# Patient Record
Sex: FEMALE | Race: BLACK OR AFRICAN AMERICAN | NOT HISPANIC OR LATINO | Employment: OTHER | URBAN - METROPOLITAN AREA
[De-identification: names, ages, dates, MRNs, and addresses within clinical notes are randomized per-mention and may not be internally consistent; named-entity substitution may affect disease eponyms.]

---

## 2020-04-29 RX ORDER — LOSARTAN POTASSIUM AND HYDROCHLOROTHIAZIDE 12.5; 1 MG/1; MG/1
0.5 TABLET ORAL DAILY
COMMUNITY
End: 2020-11-24 | Stop reason: SDUPTHER

## 2020-04-29 RX ORDER — TRAMADOL HYDROCHLORIDE 50 MG/1
50 TABLET ORAL DAILY
COMMUNITY
End: 2022-08-03 | Stop reason: ALTCHOICE

## 2020-04-29 RX ORDER — POTASSIUM CHLORIDE 750 MG/1
10 TABLET, EXTENDED RELEASE ORAL DAILY
COMMUNITY
End: 2020-10-01 | Stop reason: SDUPTHER

## 2020-04-29 RX ORDER — ASPIRIN 81 MG/1
1 TABLET ORAL DAILY
COMMUNITY

## 2020-04-29 RX ORDER — METFORMIN HYDROCHLORIDE 500 MG/1
500 TABLET, EXTENDED RELEASE ORAL 2 TIMES DAILY
COMMUNITY
End: 2020-08-27 | Stop reason: SDUPTHER

## 2020-04-29 RX ORDER — AMLODIPINE BESYLATE 2.5 MG/1
2.5 TABLET ORAL DAILY
COMMUNITY
End: 2020-10-01 | Stop reason: SDUPTHER

## 2020-04-30 ENCOUNTER — OFFICE VISIT (OUTPATIENT)
Dept: INTERNAL MEDICINE CLINIC | Facility: CLINIC | Age: 75
End: 2020-04-30
Payer: MEDICARE

## 2020-04-30 DIAGNOSIS — I10 ESSENTIAL HYPERTENSION: ICD-10-CM

## 2020-04-30 DIAGNOSIS — E78.5 DYSLIPIDEMIA: Primary | ICD-10-CM

## 2020-04-30 DIAGNOSIS — E11.9 TYPE 2 DIABETES MELLITUS WITHOUT COMPLICATION, WITHOUT LONG-TERM CURRENT USE OF INSULIN (HCC): ICD-10-CM

## 2020-04-30 DIAGNOSIS — E13.43 DIABETIC AUTONOMIC NEUROPATHY ASSOCIATED WITH OTHER SPECIFIED DIABETES MELLITUS (HCC): ICD-10-CM

## 2020-04-30 PROBLEM — E11.40 DIABETIC NEUROPATHY (HCC): Status: ACTIVE | Noted: 2020-04-30

## 2020-04-30 PROCEDURE — 99214 OFFICE O/P EST MOD 30 MIN: CPT | Performed by: INTERNAL MEDICINE

## 2020-06-30 ENCOUNTER — TELEPHONE (OUTPATIENT)
Dept: INTERNAL MEDICINE CLINIC | Facility: CLINIC | Age: 75
End: 2020-06-30

## 2020-06-30 DIAGNOSIS — E11.9 TYPE 2 DIABETES MELLITUS WITHOUT COMPLICATION, WITHOUT LONG-TERM CURRENT USE OF INSULIN (HCC): Primary | ICD-10-CM

## 2020-06-30 DIAGNOSIS — I10 ESSENTIAL HYPERTENSION: ICD-10-CM

## 2020-08-27 DIAGNOSIS — E11.9 TYPE 2 DIABETES MELLITUS WITHOUT COMPLICATION, WITHOUT LONG-TERM CURRENT USE OF INSULIN (HCC): ICD-10-CM

## 2020-08-27 DIAGNOSIS — I10 ESSENTIAL HYPERTENSION: Primary | ICD-10-CM

## 2020-08-27 RX ORDER — METOPROLOL SUCCINATE 25 MG/1
TABLET, EXTENDED RELEASE ORAL
COMMUNITY
End: 2020-08-27 | Stop reason: SDUPTHER

## 2020-08-28 RX ORDER — METOPROLOL SUCCINATE 25 MG/1
25 TABLET, EXTENDED RELEASE ORAL DAILY
Qty: 90 TABLET | Refills: 1 | Status: SHIPPED | OUTPATIENT
Start: 2020-08-28 | End: 2020-10-01 | Stop reason: SDUPTHER

## 2020-08-28 RX ORDER — METFORMIN HYDROCHLORIDE 500 MG/1
500 TABLET, EXTENDED RELEASE ORAL 2 TIMES DAILY
Qty: 180 TABLET | Refills: 1 | Status: SHIPPED | OUTPATIENT
Start: 2020-08-28 | End: 2020-10-29 | Stop reason: SDUPTHER

## 2020-09-01 DIAGNOSIS — E78.5 DYSLIPIDEMIA: Primary | ICD-10-CM

## 2020-09-02 LAB
ALBUMIN SERPL-MCNC: 4.1 G/DL (ref 3.6–5.1)
ALBUMIN/GLOB SERPL: 1.4 (CALC) (ref 1–2.5)
ALP SERPL-CCNC: 78 U/L (ref 37–153)
ALT SERPL-CCNC: 11 U/L (ref 6–29)
AST SERPL-CCNC: 16 U/L (ref 10–35)
BASOPHILS # BLD AUTO: 47 CELLS/UL (ref 0–200)
BASOPHILS NFR BLD AUTO: 0.5 %
BILIRUB SERPL-MCNC: 0.5 MG/DL (ref 0.2–1.2)
BUN SERPL-MCNC: 16 MG/DL (ref 7–25)
BUN/CREAT SERPL: ABNORMAL (CALC) (ref 6–22)
CALCIUM SERPL-MCNC: 9.9 MG/DL (ref 8.6–10.4)
CHLORIDE SERPL-SCNC: 105 MMOL/L (ref 98–110)
CHOLEST SERPL-MCNC: 111 MG/DL
CHOLEST/HDLC SERPL: 1.9 (CALC)
CO2 SERPL-SCNC: 29 MMOL/L (ref 20–32)
CREAT SERPL-MCNC: 0.71 MG/DL (ref 0.6–0.93)
EOSINOPHIL # BLD AUTO: 381 CELLS/UL (ref 15–500)
EOSINOPHIL NFR BLD AUTO: 4.1 %
ERYTHROCYTE [DISTWIDTH] IN BLOOD BY AUTOMATED COUNT: 16.5 % (ref 11–15)
GLOBULIN SER CALC-MCNC: 2.9 G/DL (CALC) (ref 1.9–3.7)
GLUCOSE SERPL-MCNC: 102 MG/DL (ref 65–99)
HBA1C MFR BLD: 6.7 % OF TOTAL HGB
HCT VFR BLD AUTO: 33.8 % (ref 35–45)
HDLC SERPL-MCNC: 57 MG/DL
HGB BLD-MCNC: 10.2 G/DL (ref 11.7–15.5)
LDLC SERPL CALC-MCNC: 39 MG/DL (CALC)
LYMPHOCYTES # BLD AUTO: 2781 CELLS/UL (ref 850–3900)
LYMPHOCYTES NFR BLD AUTO: 29.9 %
MCH RBC QN AUTO: 20.2 PG (ref 27–33)
MCHC RBC AUTO-ENTMCNC: 30.2 G/DL (ref 32–36)
MCV RBC AUTO: 66.8 FL (ref 80–100)
MONOCYTES # BLD AUTO: 707 CELLS/UL (ref 200–950)
MONOCYTES NFR BLD AUTO: 7.6 %
NEUTROPHILS # BLD AUTO: 5385 CELLS/UL (ref 1500–7800)
NEUTROPHILS NFR BLD AUTO: 57.9 %
NONHDLC SERPL-MCNC: 54 MG/DL (CALC)
PLATELET # BLD AUTO: 363 THOUSAND/UL (ref 140–400)
PMV BLD REES-ECKER: 10.6 FL (ref 7.5–12.5)
POTASSIUM SERPL-SCNC: 4.2 MMOL/L (ref 3.5–5.3)
PROT SERPL-MCNC: 7 G/DL (ref 6.1–8.1)
RBC # BLD AUTO: 5.06 MILLION/UL (ref 3.8–5.1)
SL AMB EGFR AFRICAN AMERICAN: 96 ML/MIN/1.73M2
SL AMB EGFR NON AFRICAN AMERICAN: 83 ML/MIN/1.73M2
SODIUM SERPL-SCNC: 140 MMOL/L (ref 135–146)
TRIGL SERPL-MCNC: 66 MG/DL
WBC # BLD AUTO: 9.3 THOUSAND/UL (ref 3.8–10.8)

## 2020-09-02 RX ORDER — ATORVASTATIN CALCIUM 10 MG/1
TABLET, FILM COATED ORAL
Qty: 90 TABLET | Refills: 1 | Status: SHIPPED | OUTPATIENT
Start: 2020-09-02 | End: 2020-12-10 | Stop reason: SDUPTHER

## 2020-09-03 ENCOUNTER — TELEPHONE (OUTPATIENT)
Dept: FAMILY MEDICINE CLINIC | Facility: CLINIC | Age: 75
End: 2020-09-03

## 2020-09-04 NOTE — TELEPHONE ENCOUNTER
I called [facility] and left message on machine per Dr Booth Mantle she will need an appt to discuss lab results in office  I advised her to give our office a call back to schedule appt

## 2020-09-09 ENCOUNTER — OFFICE VISIT (OUTPATIENT)
Dept: FAMILY MEDICINE CLINIC | Facility: CLINIC | Age: 75
End: 2020-09-09
Payer: MEDICARE

## 2020-09-09 VITALS
OXYGEN SATURATION: 98 % | BODY MASS INDEX: 24.98 KG/M2 | TEMPERATURE: 97.8 F | HEART RATE: 78 BPM | WEIGHT: 136.6 LBS | DIASTOLIC BLOOD PRESSURE: 60 MMHG | SYSTOLIC BLOOD PRESSURE: 120 MMHG | RESPIRATION RATE: 16 BRPM

## 2020-09-09 DIAGNOSIS — L65.9 FALLING HAIR: ICD-10-CM

## 2020-09-09 DIAGNOSIS — E78.5 DYSLIPIDEMIA: ICD-10-CM

## 2020-09-09 DIAGNOSIS — I10 ESSENTIAL HYPERTENSION: ICD-10-CM

## 2020-09-09 DIAGNOSIS — E11.9 TYPE 2 DIABETES MELLITUS WITHOUT COMPLICATION, WITHOUT LONG-TERM CURRENT USE OF INSULIN (HCC): Primary | ICD-10-CM

## 2020-09-09 PROCEDURE — 99214 OFFICE O/P EST MOD 30 MIN: CPT | Performed by: INTERNAL MEDICINE

## 2020-10-01 DIAGNOSIS — I10 ESSENTIAL HYPERTENSION: ICD-10-CM

## 2020-10-01 DIAGNOSIS — E87.6 LOW BLOOD POTASSIUM: Primary | ICD-10-CM

## 2020-10-01 RX ORDER — METOPROLOL SUCCINATE 25 MG/1
25 TABLET, EXTENDED RELEASE ORAL DAILY
Qty: 90 TABLET | Refills: 1 | Status: SHIPPED | OUTPATIENT
Start: 2020-10-01 | End: 2020-12-10 | Stop reason: SDUPTHER

## 2020-10-01 RX ORDER — POTASSIUM CHLORIDE 750 MG/1
10 TABLET, EXTENDED RELEASE ORAL DAILY
Qty: 90 TABLET | Refills: 1 | Status: SHIPPED | OUTPATIENT
Start: 2020-10-01 | End: 2020-12-10 | Stop reason: SDUPTHER

## 2020-10-01 RX ORDER — AMLODIPINE BESYLATE 2.5 MG/1
2.5 TABLET ORAL DAILY
Qty: 90 TABLET | Refills: 1 | Status: SHIPPED | OUTPATIENT
Start: 2020-10-01 | End: 2020-12-10 | Stop reason: SDUPTHER

## 2020-10-29 DIAGNOSIS — E11.9 TYPE 2 DIABETES MELLITUS WITHOUT COMPLICATION, WITHOUT LONG-TERM CURRENT USE OF INSULIN (HCC): ICD-10-CM

## 2020-10-29 RX ORDER — METFORMIN HYDROCHLORIDE 500 MG/1
TABLET, EXTENDED RELEASE ORAL
Qty: 180 TABLET | Refills: 1 | Status: SHIPPED | OUTPATIENT
Start: 2020-10-29 | End: 2021-05-28 | Stop reason: SDUPTHER

## 2020-10-29 RX ORDER — METFORMIN HYDROCHLORIDE 500 MG/1
500 TABLET, EXTENDED RELEASE ORAL 2 TIMES DAILY
Qty: 60 TABLET | Refills: 0 | Status: SHIPPED | OUTPATIENT
Start: 2020-10-29 | End: 2020-10-29

## 2020-11-02 ENCOUNTER — IMMUNIZATIONS (OUTPATIENT)
Dept: FAMILY MEDICINE CLINIC | Facility: CLINIC | Age: 75
End: 2020-11-02
Payer: MEDICARE

## 2020-11-02 DIAGNOSIS — Z23 FLU VACCINE NEED: Primary | ICD-10-CM

## 2020-11-02 PROCEDURE — G0008 ADMIN INFLUENZA VIRUS VAC: HCPCS | Performed by: INTERNAL MEDICINE

## 2020-11-02 PROCEDURE — 90662 IIV NO PRSV INCREASED AG IM: CPT | Performed by: INTERNAL MEDICINE

## 2020-11-24 DIAGNOSIS — I10 ESSENTIAL HYPERTENSION: Primary | ICD-10-CM

## 2020-11-25 RX ORDER — LOSARTAN POTASSIUM AND HYDROCHLOROTHIAZIDE 12.5; 1 MG/1; MG/1
0.5 TABLET ORAL DAILY
Qty: 90 TABLET | Refills: 1 | Status: SHIPPED | OUTPATIENT
Start: 2020-11-25 | End: 2021-10-07 | Stop reason: SDUPTHER

## 2020-12-04 ENCOUNTER — TELEPHONE (OUTPATIENT)
Dept: FAMILY MEDICINE CLINIC | Facility: CLINIC | Age: 75
End: 2020-12-04

## 2020-12-10 ENCOUNTER — OFFICE VISIT (OUTPATIENT)
Dept: FAMILY MEDICINE CLINIC | Facility: CLINIC | Age: 75
End: 2020-12-10
Payer: MEDICARE

## 2020-12-10 VITALS
TEMPERATURE: 97.7 F | OXYGEN SATURATION: 97 % | DIASTOLIC BLOOD PRESSURE: 70 MMHG | SYSTOLIC BLOOD PRESSURE: 144 MMHG | RESPIRATION RATE: 16 BRPM | HEIGHT: 62 IN | BODY MASS INDEX: 26.13 KG/M2 | WEIGHT: 142 LBS | HEART RATE: 82 BPM

## 2020-12-10 DIAGNOSIS — E78.5 DYSLIPIDEMIA: ICD-10-CM

## 2020-12-10 DIAGNOSIS — E11.9 TYPE 2 DIABETES MELLITUS WITHOUT COMPLICATION, WITHOUT LONG-TERM CURRENT USE OF INSULIN (HCC): Primary | ICD-10-CM

## 2020-12-10 DIAGNOSIS — I10 ESSENTIAL HYPERTENSION: ICD-10-CM

## 2020-12-10 DIAGNOSIS — E87.6 LOW BLOOD POTASSIUM: ICD-10-CM

## 2020-12-10 PROBLEM — D64.9 CHRONIC ANEMIA: Status: ACTIVE | Noted: 2020-12-10

## 2020-12-10 PROCEDURE — 99214 OFFICE O/P EST MOD 30 MIN: CPT | Performed by: INTERNAL MEDICINE

## 2020-12-10 RX ORDER — METOPROLOL SUCCINATE 25 MG/1
25 TABLET, EXTENDED RELEASE ORAL DAILY
Qty: 90 TABLET | Refills: 1 | Status: SHIPPED | OUTPATIENT
Start: 2020-12-10 | End: 2021-07-01 | Stop reason: SDUPTHER

## 2020-12-10 RX ORDER — AMLODIPINE BESYLATE 2.5 MG/1
2.5 TABLET ORAL DAILY
Qty: 90 TABLET | Refills: 1 | Status: SHIPPED | OUTPATIENT
Start: 2020-12-10 | End: 2021-07-01 | Stop reason: SDUPTHER

## 2020-12-10 RX ORDER — ATORVASTATIN CALCIUM 10 MG/1
10 TABLET, FILM COATED ORAL DAILY
Qty: 90 TABLET | Refills: 1 | Status: SHIPPED | OUTPATIENT
Start: 2020-12-10 | End: 2021-07-01 | Stop reason: SDUPTHER

## 2020-12-10 RX ORDER — POTASSIUM CHLORIDE 750 MG/1
10 TABLET, EXTENDED RELEASE ORAL DAILY
Qty: 90 TABLET | Refills: 1 | Status: SHIPPED | OUTPATIENT
Start: 2020-12-10 | End: 2021-10-05

## 2020-12-30 ENCOUNTER — LAB (OUTPATIENT)
Dept: LAB | Facility: HOSPITAL | Age: 75
End: 2020-12-30
Payer: MEDICARE

## 2020-12-30 DIAGNOSIS — L65.9 FALLING HAIR: ICD-10-CM

## 2020-12-30 LAB — TSH SERPL DL<=0.05 MIU/L-ACNC: 2.11 UIU/ML (ref 0.34–5.6)

## 2020-12-30 PROCEDURE — 36415 COLL VENOUS BLD VENIPUNCTURE: CPT

## 2020-12-30 PROCEDURE — 84443 ASSAY THYROID STIM HORMONE: CPT

## 2021-01-07 ENCOUNTER — TELEPHONE (OUTPATIENT)
Dept: FAMILY MEDICINE CLINIC | Facility: CLINIC | Age: 76
End: 2021-01-07

## 2021-01-21 ENCOUNTER — OFFICE VISIT (OUTPATIENT)
Dept: FAMILY MEDICINE CLINIC | Facility: CLINIC | Age: 76
End: 2021-01-21
Payer: MEDICARE

## 2021-01-21 VITALS
BODY MASS INDEX: 26.68 KG/M2 | DIASTOLIC BLOOD PRESSURE: 70 MMHG | SYSTOLIC BLOOD PRESSURE: 140 MMHG | HEIGHT: 62 IN | WEIGHT: 145 LBS | TEMPERATURE: 98.3 F | RESPIRATION RATE: 16 BRPM | OXYGEN SATURATION: 98 % | HEART RATE: 60 BPM

## 2021-01-21 DIAGNOSIS — K11.20 SIALADENITIS: ICD-10-CM

## 2021-01-21 DIAGNOSIS — D64.9 CHRONIC ANEMIA: ICD-10-CM

## 2021-01-21 DIAGNOSIS — R22.1 LUMP IN NECK: ICD-10-CM

## 2021-01-21 DIAGNOSIS — E11.9 TYPE 2 DIABETES MELLITUS WITHOUT COMPLICATION, WITHOUT LONG-TERM CURRENT USE OF INSULIN (HCC): Primary | ICD-10-CM

## 2021-01-21 DIAGNOSIS — E78.5 DYSLIPIDEMIA: ICD-10-CM

## 2021-01-21 DIAGNOSIS — I10 ESSENTIAL HYPERTENSION: ICD-10-CM

## 2021-01-21 PROCEDURE — 99214 OFFICE O/P EST MOD 30 MIN: CPT | Performed by: INTERNAL MEDICINE

## 2021-01-21 RX ORDER — AMOXICILLIN 500 MG/1
500 CAPSULE ORAL EVERY 8 HOURS SCHEDULED
Qty: 21 CAPSULE | Refills: 0 | Status: SHIPPED | OUTPATIENT
Start: 2021-01-21 | End: 2021-01-28

## 2021-01-21 NOTE — ASSESSMENT & PLAN NOTE
Lab Results   Component Value Date    HGBA1C 6 7 (H) 09/01/2020   HGBA1C at goal , continue metformin 500 mg daily  Educated on lifestyle modifications

## 2021-01-21 NOTE — ASSESSMENT & PLAN NOTE
History of chronic anemia  Hemoglobin 10 2 g per dL on September 2020  likely due to thalassemia  Patient denies any active bleeding  Will check CBC

## 2021-01-21 NOTE — ASSESSMENT & PLAN NOTE
Patient blood pressure was mildly elevated in the office, patient states she has not been compliant with her dietary restriction, she has been eating salty food lately  Continue amlodipine, Losartan hydrochlorothiazide and metoprolol  No side effects reported

## 2021-01-21 NOTE — ASSESSMENT & PLAN NOTE
Patient has a lump in the submandibular area measuring approximately 2-3 cm, mildly tender  Patient states she first noted 1 week ego, is been decreasing since then  Patient denies any fever, she did have some sinus pressure, sore throat on postnasal drip  Advised supportive treatment, amoxicillin 500 mg every 8 hours for 7 days

## 2021-01-21 NOTE — ASSESSMENT & PLAN NOTE
Patient has a lump in the submandibular area measuring approximately 2-3 cm, mildly tender  Patient states she 1st noticed the 1 week ego, is been decreasing since then  Patient denies any fever, she did have some sinus pressure, sore throat on postnasal drip    US neck ordered

## 2021-01-21 NOTE — PROGRESS NOTES
Assessment/Plan:    Type 2 diabetes mellitus without complication, without long-term current use of insulin ([facility])    Lab Results   Component Value Date    HGBA1C 6 7 (H) 09/01/2020   HGBA1C at goal , continue metformin 500 mg daily  Educated on lifestyle modifications     Essential hypertension  Patient blood pressure was mildly elevated in the office, patient states she has not been compliant with her dietary restriction, she has been eating salty food lately  Continue amlodipine, Losartan hydrochlorothiazide and metoprolol  No side effects reported  Dyslipidemia  Continue atorvastatin 10 mg daily  Lipid profile from September 2020 reviewed  Chronic anemia  History of chronic anemia  Hemoglobin 10 2 g per dL on September 2020  likely due to thalassemia  Patient denies any active bleeding  Will check CBC  Lump in neck  Patient has a lump in the submandibular area measuring approximately 2-3 cm, mildly tender  Patient states she 1st noticed the 1 week ego, is been decreasing since then  Patient denies any fever, she did have some sinus pressure, sore throat on postnasal drip  US neck ordered           Sialadenitis  Patient has a lump in the submandibular area measuring approximately 2-3 cm, mildly tender  Patient states she first noted 1 week ego, is been decreasing since then  Patient denies any fever, she did have some sinus pressure, sore throat on postnasal drip  Advised supportive treatment, amoxicillin 500 mg every 8 hours for 7 days  Diagnoses and all orders for this visit:    Type 2 diabetes mellitus without complication, without long-term current use of insulin ([facility])    Essential hypertension    Dyslipidemia    Chronic anemia    Lump in neck  -     US head neck soft tissue; Future  -     amoxicillin (AMOXIL) 500 mg capsule;  Take 1 capsule (500 mg total) by mouth every 8 (eight) hours for 7 days    Sialadenitis          Subjective:      Patient ID: Shaheen Castaneda is a 67 y o  female  Patient is a 50-year-old female with a past medical history significant for hypertension, hyperlipidemia, status see me a, she comes today complaining of a lump in her neck the last week, she has been feeling also some sinus pressure and post nasal drip  No nausea, no vomiting, no fever, no chills, no diarrhea  Patient denies any sick contacts  BP noted to be mildly elevated in the office, patient states she has been taking all her medications including her antihypertensive  Blood work from September review  TSH noted to be within normal limits  The following portions of the patient's history were reviewed and updated as appropriate: allergies, current medications, past family history, past medical history, past social history, past surgical history and problem list     Review of Systems   Constitutional: Negative for appetite change, chills and fever  HENT: Positive for sinus pressure, sinus pain, sore throat and voice change  Negative for congestion, rhinorrhea, sneezing and trouble swallowing  Eyes: Negative for discharge  Respiratory: Negative for cough and shortness of breath  Cardiovascular: Positive for leg swelling  Negative for chest pain and palpitations  Gastrointestinal: Negative for blood in stool, constipation, diarrhea, nausea and vomiting  Genitourinary: Negative for difficulty urinating  Musculoskeletal: Positive for arthralgias  Skin: Negative for rash  Neurological: Negative for dizziness and headaches  Hematological: Positive for adenopathy  Psychiatric/Behavioral: Positive for sleep disturbance  Objective:      /70 (BP Location: Left arm, Patient Position: Sitting, Cuff Size: Standard)   Pulse 60   Temp 98 3 °F (36 8 °C) (Temporal)   Resp 16   Ht 5' 2" (1 575 m)   Wt 65 8 kg (145 lb)   SpO2 98%   BMI 26 52 kg/m²          Physical Exam  Vitals signs reviewed  Constitutional:       Appearance: She is obese     HENT: Head: Normocephalic  Right Ear: Tympanic membrane, ear canal and external ear normal  There is no impacted cerumen  Left Ear: Tympanic membrane, ear canal and external ear normal  There is no impacted cerumen  Nose: No congestion or rhinorrhea  Mouth/Throat:      Pharynx: No oropharyngeal exudate or posterior oropharyngeal erythema  Eyes:      General: No scleral icterus  Extraocular Movements: Extraocular movements intact  Conjunctiva/sclera: Conjunctivae normal       Pupils: Pupils are equal, round, and reactive to light  Cardiovascular:      Rate and Rhythm: Normal rate and regular rhythm  Pulses: Normal pulses  Heart sounds: No murmur  Pulmonary:      Effort: Pulmonary effort is normal  No respiratory distress  Breath sounds: Normal breath sounds  No stridor  No wheezing or rhonchi  Abdominal:      General: Bowel sounds are normal  There is no distension  Tenderness: There is no abdominal tenderness  Musculoskeletal:      Right lower leg: Edema present  Left lower leg: Edema present  Lymphadenopathy:      Cervical: Cervical adenopathy present  Skin:     General: Skin is warm  Coloration: Skin is not jaundiced  Neurological:      General: No focal deficit present  Mental Status: She is alert and oriented to person, place, and time     Psychiatric:         Mood and Affect: Mood normal          Behavior: Behavior normal

## 2021-01-27 ENCOUNTER — HOSPITAL ENCOUNTER (OUTPATIENT)
Dept: ULTRASOUND IMAGING | Facility: HOSPITAL | Age: 76
Discharge: HOME | End: 2021-01-27
Payer: MEDICARE

## 2021-01-27 DIAGNOSIS — R22.1 LUMP IN NECK: ICD-10-CM

## 2021-01-27 DIAGNOSIS — E11.9 TYPE 2 DIABETES MELLITUS WITHOUT COMPLICATION, WITHOUT LONG-TERM CURRENT USE OF INSULIN (HCC): Primary | ICD-10-CM

## 2021-01-27 PROCEDURE — 76536 US EXAM OF HEAD AND NECK: CPT

## 2021-01-27 NOTE — TELEPHONE ENCOUNTER
Patient requesting refill for One touch ultra blue test strips, 90 day supply  A message was left for patient to call back to confirm how many times per day she tests her blood sugar

## 2021-01-28 ENCOUNTER — IMMUNIZATIONS (OUTPATIENT)
Dept: FAMILY MEDICINE CLINIC | Facility: HOSPITAL | Age: 76
End: 2021-01-28

## 2021-01-28 DIAGNOSIS — Z23 ENCOUNTER FOR IMMUNIZATION: Primary | ICD-10-CM

## 2021-01-28 PROCEDURE — 0011A SARS-COV-2 / COVID-19 MRNA VACCINE (MODERNA) 100 MCG: CPT

## 2021-01-28 PROCEDURE — 91301 SARS-COV-2 / COVID-19 MRNA VACCINE (MODERNA) 100 MCG: CPT

## 2021-02-24 ENCOUNTER — IMMUNIZATIONS (OUTPATIENT)
Dept: FAMILY MEDICINE CLINIC | Facility: HOSPITAL | Age: 76
End: 2021-02-24

## 2021-02-24 DIAGNOSIS — Z23 ENCOUNTER FOR IMMUNIZATION: Primary | ICD-10-CM

## 2021-02-24 DIAGNOSIS — E11.9 TYPE 2 DIABETES MELLITUS WITHOUT COMPLICATION, WITHOUT LONG-TERM CURRENT USE OF INSULIN (HCC): ICD-10-CM

## 2021-02-24 PROCEDURE — 91301 SARS-COV-2 / COVID-19 MRNA VACCINE (MODERNA) 100 MCG: CPT

## 2021-02-24 PROCEDURE — 0012A SARS-COV-2 / COVID-19 MRNA VACCINE (MODERNA) 100 MCG: CPT

## 2021-03-03 DIAGNOSIS — E11.9 TYPE 2 DIABETES MELLITUS WITHOUT COMPLICATION, WITHOUT LONG-TERM CURRENT USE OF INSULIN (HCC): ICD-10-CM

## 2021-03-09 ENCOUNTER — OFFICE VISIT (OUTPATIENT)
Dept: FAMILY MEDICINE CLINIC | Facility: CLINIC | Age: 76
End: 2021-03-09
Payer: MEDICARE

## 2021-03-09 VITALS
SYSTOLIC BLOOD PRESSURE: 140 MMHG | HEART RATE: 60 BPM | TEMPERATURE: 97.9 F | OXYGEN SATURATION: 98 % | WEIGHT: 145 LBS | BODY MASS INDEX: 26.68 KG/M2 | DIASTOLIC BLOOD PRESSURE: 80 MMHG | RESPIRATION RATE: 18 BRPM | HEIGHT: 62 IN

## 2021-03-09 DIAGNOSIS — R10.84 GENERALIZED ABDOMINAL PAIN: ICD-10-CM

## 2021-03-09 DIAGNOSIS — Z00.00 MEDICARE ANNUAL WELLNESS VISIT, INITIAL: Primary | ICD-10-CM

## 2021-03-09 PROBLEM — R10.9 ABDOMINAL PAIN: Status: ACTIVE | Noted: 2021-03-09

## 2021-03-09 PROBLEM — M25.512 LEFT SHOULDER PAIN: Status: ACTIVE | Noted: 2021-03-09

## 2021-03-09 PROCEDURE — 1123F ACP DISCUSS/DSCN MKR DOCD: CPT | Performed by: INTERNAL MEDICINE

## 2021-03-09 PROCEDURE — G0438 PPPS, INITIAL VISIT: HCPCS | Performed by: INTERNAL MEDICINE

## 2021-03-09 PROCEDURE — 99214 OFFICE O/P EST MOD 30 MIN: CPT | Performed by: INTERNAL MEDICINE

## 2021-03-09 RX ORDER — PANTOPRAZOLE SODIUM 40 MG/1
40 TABLET, DELAYED RELEASE ORAL DAILY
Qty: 30 TABLET | Refills: 0 | Status: SHIPPED | OUTPATIENT
Start: 2021-03-09 | End: 2021-04-05 | Stop reason: SDUPTHER

## 2021-03-09 NOTE — PATIENT INSTRUCTIONS
Medicare Preventive Visit Patient Instructions  Thank you for completing your Welcome to Medicare Visit or Medicare Annual Wellness Visit today  Your next wellness visit will be due in one year (3/10/2022)  The screening/preventive services that you may require over the next 5-10 years are detailed below  Some tests may not apply to you based off risk factors and/or age  Screening tests ordered at today's visit but not completed yet may show as past due  Also, please note that scanned in results may not display below  Preventive Screenings:  Service Recommendations Previous Testing/Comments   Colorectal Cancer Screening  * Colonoscopy    * Fecal Occult Blood Test (FOBT)/Fecal Immunochemical Test (FIT)  * Fecal DNA/Cologuard Test  * Flexible Sigmoidoscopy Age: 54-65 years old   Colonoscopy: every 10 years (may be performed more frequently if at higher risk)  OR  FOBT/FIT: every 1 year  OR  Cologuard: every 3 years  OR  Sigmoidoscopy: every 5 years  Screening may be recommended earlier than age 48 if at higher risk for colorectal cancer  Also, an individualized decision between you and your healthcare provider will decide whether screening between the ages of 74-80 would be appropriate  Colonoscopy: Not on file  FOBT/FIT: Not on file  Cologuard: Not on file  Sigmoidoscopy: Not on file    Due for Colonoscopy - Low Risk     Breast Cancer Screening Age: 36 years old  Frequency: every 1-2 years  Not required if history of left and right mastectomy Mammogram: Not on file    Risks and Benefits Discussed   Cervical Cancer Screening Between the ages of 21-29, pap smear recommended once every 3 years  Between the ages of 33-67, can perform pap smear with HPV co-testing every 5 years     Recommendations may differ for women with a history of total hysterectomy, cervical cancer, or abnormal pap smears in past  Pap Smear: Not on file    Risks and Benefits Discussed   Hepatitis C Screening Once for adults born between 1945 and 1965  More frequently in patients at high risk for Hepatitis C Hep C Antibody: Not on file    Risks and Benefits Discussed   Diabetes Screening 1-2 times per year if you're at risk for diabetes or have pre-diabetes Fasting glucose: No results in last 5 years   A1C: 6 7 % of total Hgb    Screening Not Indicated  History Diabetes  Risks and Benefits Discussed   Cholesterol Screening Once every 5 years if you don't have a lipid disorder  May order more often based on risk factors  Lipid panel: 09/01/2020    Screening Current     Other Preventive Screenings Covered by Medicare:  1  Abdominal Aortic Aneurysm (AAA) Screening: covered once if your at risk  You're considered to be at risk if you have a family history of AAA  2  Lung Cancer Screening: covers low dose CT scan once per year if you meet all of the following conditions: (1) Age 50-69; (2) No signs or symptoms of lung cancer; (3) Current smoker or have quit smoking within the last 15 years; (4) You have a tobacco smoking history of at least 30 pack years (packs per day multiplied by number of years you smoked); (5) You get a written order from a healthcare provider  3  Glaucoma Screening: covered annually if you're considered high risk: (1) You have diabetes OR (2) Family history of glaucoma OR (3)  aged 48 and older OR (3)  American aged 72 and older  3  Osteoporosis Screening: covered every 2 years if you meet one of the following conditions: (1) You're estrogen deficient and at risk for osteoporosis based off medical history and other findings; (2) Have a vertebral abnormality; (3) On glucocorticoid therapy for more than 3 months; (4) Have primary hyperparathyroidism; (5) On osteoporosis medications and need to assess response to drug therapy  · Last bone density test (DXA Scan): Not on file  5  HIV Screening: covered annually if you're between the age of 12-76   Also covered annually if you are younger than 13 and older than 72 with risk factors for HIV infection  For pregnant patients, it is covered up to 3 times per pregnancy  Immunizations:  Immunization Recommendations   Influenza Vaccine Annual influenza vaccination during flu season is recommended for all persons aged >= 6 months who do not have contraindications   Pneumococcal Vaccine (Prevnar and Pneumovax)  * Prevnar = PCV13  * Pneumovax = PPSV23   Adults 25-60 years old: 1-3 doses may be recommended based on certain risk factors  Adults 72 years old: Prevnar (PCV13) vaccine recommended followed by Pneumovax (PPSV23) vaccine  If already received PPSV23 since turning 65, then PCV13 recommended at least one year after PPSV23 dose  Hepatitis B Vaccine 3 dose series if at intermediate or high risk (ex: diabetes, end stage renal disease, liver disease)   Tetanus (Td) Vaccine - COST NOT COVERED BY MEDICARE PART B Following completion of primary series, a booster dose should be given every 10 years to maintain immunity against tetanus  Td may also be given as tetanus wound prophylaxis  Tdap Vaccine - COST NOT COVERED BY MEDICARE PART B Recommended at least once for all adults  For pregnant patients, recommended with each pregnancy  Shingles Vaccine (Shingrix) - COST NOT COVERED BY MEDICARE PART B  2 shot series recommended in those aged 48 and above     Health Maintenance Due:  There are no preventive care reminders to display for this patient  Immunizations Due:      Topic Date Due    DTaP,Tdap,and Td Vaccines (1 - Tdap) 07/02/1966    Pneumococcal Vaccine: 65+ Years (1 of 1 - PPSV23) 07/02/2009     Advance Directives   What are advance directives? Advance directives are legal documents that state your wishes and plans for medical care  These plans are made ahead of time in case you lose your ability to make decisions for yourself  Advance directives can apply to any medical decision, such as the treatments you want, and if you want to donate organs     What are the types of advance directives? There are many types of advance directives, and each state has rules about how to use them  You may choose a combination of any of the following:  · Living will: This is a written record of the treatment you want  You can also choose which treatments you do not want, which to limit, and which to stop at a certain time  This includes surgery, medicine, IV fluid, and tube feedings  · Durable power of  for healthcare [facility]): This is a written record that states who you want to make healthcare choices for you when you are unable to make them for yourself  This person, called a proxy, is usually a family member or a friend  You may choose more than 1 proxy  · Do not resuscitate (DNR) order:  A DNR order is used in case your heart stops beating or you stop breathing  It is a request not to have certain forms of treatment, such as CPR  A DNR order may be included in other types of advance directives  · Medical directive: This covers the care that you want if you are in a coma, near death, or unable to make decisions for yourself  You can list the treatments you want for each condition  Treatment may include pain medicine, surgery, blood transfusions, dialysis, IV or tube feedings, and a ventilator (breathing machine)  · Values history: This document has questions about your views, beliefs, and how you feel and think about life  This information can help others choose the care that you would choose  Why are advance directives important? An advance directive helps you control your care  Although spoken wishes may be used, it is better to have your wishes written down  Spoken wishes can be misunderstood, or not followed  Treatments may be given even if you do not want them  An advance directive may make it easier for your family to make difficult choices about your care     Weight Management   Why it is important to manage your weight:  Being overweight increases your risk of health conditions such as heart disease, high blood pressure, type 2 diabetes, and certain types of cancer  It can also increase your risk for osteoarthritis, sleep apnea, and other respiratory problems  Aim for a slow, steady weight loss  Even a small amount of weight loss can lower your risk of health problems  How to lose weight safely:  A safe and healthy way to lose weight is to eat fewer calories and get regular exercise  You can lose up about 1 pound a week by decreasing the number of calories you eat by 500 calories each day  Healthy meal plan for weight management:  A healthy meal plan includes a variety of foods, contains fewer calories, and helps you stay healthy  A healthy meal plan includes the following:  · Eat whole-grain foods more often  A healthy meal plan should contain fiber  Fiber is the part of grains, fruits, and vegetables that is not broken down by your body  Whole-grain foods are healthy and provide extra fiber in your diet  Some examples of whole-grain foods are whole-wheat breads and pastas, oatmeal, brown rice, and bulgur  · Eat a variety of vegetables every day  Include dark, leafy greens such as spinach, kale, juanis greens, and mustard greens  Eat yellow and orange vegetables such as carrots, sweet potatoes, and winter squash  · Eat a variety of fruits every day  Choose fresh or canned fruit (canned in its own juice or light syrup) instead of juice  Fruit juice has very little or no fiber  · Eat low-fat dairy foods  Drink fat-free (skim) milk or 1% milk  Eat fat-free yogurt and low-fat cottage cheese  Try low-fat cheeses such as mozzarella and other reduced-fat cheeses  · Choose meat and other protein foods that are low in fat  Choose beans or other legumes such as split peas or lentils  Choose fish, skinless poultry (chicken or turkey), or lean cuts of red meat (beef or pork)  Before you cook meat or poultry, cut off any visible fat  · Use less fat and oil    Try baking foods instead of frying them  Add less fat, such as margarine, sour cream, regular salad dressing and mayonnaise to foods  Eat fewer high-fat foods  Some examples of high-fat foods include french fries, doughnuts, ice cream, and cakes  · Eat fewer sweets  Limit foods and drinks that are high in sugar  This includes candy, cookies, regular soda, and sweetened drinks  Exercise:  Exercise at least 30 minutes per day on most days of the week  Some examples of exercise include walking, biking, dancing, and swimming  You can also fit in more physical activity by taking the stairs instead of the elevator or parking farther away from stores  Ask your healthcare provider about the best exercise plan for you  © Copyright [employer] 2018 Information is for End User's use only and may not be sold, redistributed or otherwise used for commercial purposes   All illustrations and images included in CareNotes® are the copyrighted property of [employer]  or [address] [place] [address]

## 2021-03-09 NOTE — PROGRESS NOTES
Assessment/Plan:    Abdominal pain  · Patient c/o abdominal pain 1 5 weeks, feels bloated  Fluctuates in intensity  Difficult to characterize, describes it as annoying pain  Pain radiates to under the left breast  No nausea, vomiting, feels hungry but is afraid to eat as she is scared about pain aggravation  No diarrhea, constipation  Has been drinking lance tea for the pain, takes OTC Tums which relieves the pain partially  No melena, hematochezia  Has been passing gas but reports trapping as well  · Had work up for pancreatic pathology few years ago, as per patient, which was unremarkable  Was advised to avoid fatty and spicy foods --> she felt better after that  · However, recently has not been compliant with low fat diet  · O/e : epigastric and left lower quadrant discomfort     PLAN :   · US abdomen to evaluate for gall bladder pathology   · CMP, CBC   · Protonix 40 mg PO daily     Lump in neck  · Patient had a lump in the neck, on the right side   · Underwent US : which showed normal-appearing subcentimeter lymph nodes in the area of concern  No enlarged LNs, suspicious mass or localized fluid collection  Mildly heterogenous submandibular glands b/l, non specific   · Lump has decreased significantly in size  Patient also received a course of Augmentin for suspected sialadenitis during prior visit  Left shoulder pain  · Patient c/o left shoulder and neck pain  · Went to [facility] on Feb 25 --> X rays were done, and was given methocarbamol  · Mild stiffness on shoulder exam   · Patient does not feel ready to go for physical therapy yet due to COVID risk     PLAN :   · Observe   · Continue supportive treatment   · If pain persists, will recommend physical therapy     Sialadenitis  · Completed a course of Augmentin during prior visit       Type 2 diabetes mellitus without complication, without long-term current use of insulin (HCC)    Lab Results   Component Value Date    HGBA1C 6 7 (H) 09/01/2020   · HbA1c at goal : 6 7   · Patient is on metformin 500 mg PO daily  Compliant, experiences no side effects     Essential hypertension  · Patient is on amlodipine, losartan- HCTZ (has been taking the full dose now)   · BP today: 140 / 80 mmHg  · Is not compliant with low salt diet   · Has been advised dietary changes  Diagnoses and all orders for this visit:    Medicare annual wellness visit, initial    Generalized abdominal pain  -     US abdomen complete; Future  -     Comprehensive metabolic panel; Future  -     CBC and differential; Future  -     pantoprazole (PROTONIX) 40 mg tablet; Take 1 tablet (40 mg total) by mouth daily          Subjective:      Patient ID: Wayne Bishop is a 67 y o  female  Patient c/o abdominal pain 1 5 weeks, feels bloated  Fluctuates in intensity  Difficult to characterize, describes it as annoying pain  Pain radiates to under the left breast  No nausea, vomiting, feels hungry but is afraid to eat as she is scared about pain aggravation  No diarrhea, constipation  Has been drinking lance tea for the pain, takes OTC Tums which relieves the pain partially  No melena, hematochezia  Has been passing gas but reports trapping as well  Had work up for pancreatic pathology few years ago, as per patient, which was unremarkable  Was advised to avoid fatty and spicy foods --> she felt better after that  However, recently has not been compliant with low fat diet  No chest pain, SOB, no recent weight changes  2 months ago, patient was seen for right submandibular gland swelling -- underwent ultrasound which showed nothing significant  1 week after that episode --> patient developed upper back pain on left side and left sided neck pain  Attributes it to muscle strain due to improper positioning  On feb 25 --> went to [facility] --> had X rays obtained : which ruled out fracture  She was also given medication for muscle strain at that time --> methocarbamol  HTN : BP today is 140 / 80 mmHg  Takes amlodipine 2 5 mg PO daily, Losartan-HCTZ 100 - 12 5 mg PO daily -- takes full dose  Has not been very compliant with a low salt diet  DM2 : BG this morning was 165 mg/dl pre-meal   Takes metformin 500 mg PO daily  Thalassemia : Hgb - 10 2, stable  The following portions of the patient's history were reviewed and updated as appropriate:   She  has a past medical history of Anxiety, Depression, Diabetes mellitus ([facility] ), Fibroid, Hypertension, and Rheumatoid arthritis ([facility] )  She   Patient Active Problem List    Diagnosis Date Noted    Abdominal pain 03/09/2021     Priority: A    Lump in neck 01/21/2021     Priority: B    Left shoulder pain 03/09/2021     Priority: C    Sialadenitis 01/21/2021     Priority: D    Type 2 diabetes mellitus without complication, without long-term current use of insulin ([facility] ) 04/30/2020     Priority: E    Essential hypertension 04/30/2020     Priority: F    Chronic anemia 12/10/2020    Dyslipidemia 04/30/2020     She  has a past surgical history that includes Colonoscopy (2013, 2017); Dilation and curettage of uterus; ORIF ankle fracture (Left); Tonsillectomy; Tubal ligation; and [place] tooth extraction  Her family history includes Alzheimer's disease (age of onset: 80) in her brother; Cancer in her brother  She  reports that she has quit smoking  She smoked 0 50 packs per day  She has never used smokeless tobacco  She reports current alcohol use  She reports that she does not use drugs    Current Outpatient Medications   Medication Sig Dispense Refill    amLODIPine (NORVASC) 2 5 mg tablet Take 1 tablet (2 5 mg total) by mouth daily 90 tablet 1    aspirin (Aspirin 81) 81 mg EC tablet Take 1 capsule by mouth daily      atorvastatin (LIPITOR) 10 mg tablet Take 1 tablet (10 mg total) by mouth daily 90 tablet 1    glucose blood test strip Use 1 each daily One Touch Ultra Blue 100 each 3    losartan-hydrochlorothiazide (HYZAAR) 100-12 5 MG per tablet Take 0 5 tablets by mouth daily 90 tablet 1    metFORMIN (GLUCOPHAGE-XR) 500 mg 24 hr tablet TAKE 1 TABLET(500 MG) BY MOUTH TWICE DAILY 180 tablet 1    metoprolol succinate (TOPROL-XL) 25 mg 24 hr tablet Take 1 tablet (25 mg total) by mouth daily 90 tablet 1    pantoprazole (PROTONIX) 40 mg tablet Take 1 tablet (40 mg total) by mouth daily 30 tablet 0    potassium chloride (K-DUR,KLOR-CON) 10 mEq tablet Take 1 tablet (10 mEq total) by mouth daily 90 tablet 1    traMADol (ULTRAM) 50 mg tablet Take 50 mg by mouth daily       No current facility-administered medications for this visit  Current Outpatient Medications on File Prior to Visit   Medication Sig    amLODIPine (NORVASC) 2 5 mg tablet Take 1 tablet (2 5 mg total) by mouth daily    aspirin (Aspirin 81) 81 mg EC tablet Take 1 capsule by mouth daily    atorvastatin (LIPITOR) 10 mg tablet Take 1 tablet (10 mg total) by mouth daily    glucose blood test strip Use 1 each daily One Touch Ultra Blue    losartan-hydrochlorothiazide (HYZAAR) 100-12 5 MG per tablet Take 0 5 tablets by mouth daily    metFORMIN (GLUCOPHAGE-XR) 500 mg 24 hr tablet TAKE 1 TABLET(500 MG) BY MOUTH TWICE DAILY    metoprolol succinate (TOPROL-XL) 25 mg 24 hr tablet Take 1 tablet (25 mg total) by mouth daily    potassium chloride (K-DUR,KLOR-CON) 10 mEq tablet Take 1 tablet (10 mEq total) by mouth daily    traMADol (ULTRAM) 50 mg tablet Take 50 mg by mouth daily     No current facility-administered medications on file prior to visit  She is allergic to shellfish allergy; shellfish-derived products; and other       Review of Systems   Constitutional: Negative for chills, fatigue, fever and unexpected weight change  HENT: Negative for sore throat  Eyes: Negative for pain and visual disturbance  Respiratory: Negative for cough and shortness of breath  Cardiovascular: Negative for chest pain, palpitations and leg swelling  Gastrointestinal: Positive for abdominal distention and abdominal pain  Negative for constipation, diarrhea, nausea and vomiting  Genitourinary: Negative for dysuria and hematuria  Musculoskeletal: Negative for arthralgias and back pain  Skin: Negative for color change and rash  Neurological: Negative for dizziness, seizures, syncope, weakness and light-headedness  Hematological: Negative for adenopathy  All other systems reviewed and are negative  Objective:      /80 (BP Location: Left arm, Patient Position: Sitting, Cuff Size: Standard)   Pulse 60   Temp 97 9 °F (36 6 °C) (Tympanic)   Resp 18   Ht 5' 2" (1 575 m)   Wt 65 8 kg (145 lb)   SpO2 98%   BMI 26 52 kg/m²          Physical Exam  Vitals signs reviewed  Constitutional:       General: She is not in acute distress  Appearance: She is normal weight  Comments: Small lump noted in the right submandibular region   HENT:      Mouth/Throat:      Mouth: Mucous membranes are moist    Eyes:      General:         Right eye: No discharge  Left eye: No discharge  Conjunctiva/sclera: Conjunctivae normal       Pupils: Pupils are equal, round, and reactive to light  Neck:      Musculoskeletal: Neck supple  Cardiovascular:      Rate and Rhythm: Normal rate and regular rhythm  Pulses: Normal pulses  Heart sounds: Normal heart sounds  No murmur  Pulmonary:      Effort: Pulmonary effort is normal  No respiratory distress  Breath sounds: Normal breath sounds  No wheezing or rales  Abdominal:      General: There is no distension  Palpations: Abdomen is soft  Tenderness: There is abdominal tenderness  Comments: Mild left lower quadrant discomfort    Musculoskeletal:      Right lower leg: No edema  Left lower leg: No edema  Skin:     General: Skin is warm  Capillary Refill: Capillary refill takes less than 2 seconds  Neurological:      General: No focal deficit present  Mental Status: She is alert and oriented to person, place, and time     Psychiatric:         Mood and Affect: Mood normal

## 2021-03-09 NOTE — ASSESSMENT & PLAN NOTE
· Patient c/o left shoulder and neck pain  · Went to [facility] on Feb 25 --> X rays were done, and was given methocarbamol     · Mild stiffness on shoulder exam   · Patient does not feel ready to go for physical therapy yet due to COVID risk     PLAN :   · Observe   · Continue supportive treatment   · If pain persists, will recommend physical therapy

## 2021-03-09 NOTE — ASSESSMENT & PLAN NOTE
· Patient had a lump in the neck, on the right side   · Underwent US : which showed normal-appearing subcentimeter lymph nodes in the area of concern  No enlarged LNs, suspicious mass or localized fluid collection  Mildly heterogenous submandibular glands b/l, non specific   · Lump has decreased significantly in size  Patient also received a course of Augmentin for suspected sialadenitis during prior visit

## 2021-03-09 NOTE — ASSESSMENT & PLAN NOTE
· Patient c/o abdominal pain 1 5 weeks, feels bloated  Fluctuates in intensity  Difficult to characterize, describes it as annoying pain  Pain radiates to under the left breast  No nausea, vomiting, feels hungry but is afraid to eat as she is scared about pain aggravation  No diarrhea, constipation  Has been drinking lance tea for the pain, takes OTC Tums which relieves the pain partially  No melena, hematochezia  Has been passing gas but reports trapping as well  · Had work up for pancreatic pathology few years ago, as per patient, which was unremarkable  Was advised to avoid fatty and spicy foods --> she felt better after that  · However, recently has not been compliant with low fat diet     · O/e : epigastric and left lower quadrant discomfort     PLAN :   · US abdomen to evaluate for gall bladder pathology   · CMP, CBC   · Protonix 40 mg PO daily

## 2021-03-09 NOTE — ASSESSMENT & PLAN NOTE
· Patient is on amlodipine, losartan- HCTZ (has been taking the full dose now)   · BP today: 140 / 80 mmHg  · Is not compliant with low salt diet   · Has been advised dietary changes

## 2021-03-09 NOTE — PROGRESS NOTES
Assessment and Plan:     Problem List Items Addressed This Visit     None           Preventive health issues were discussed with patient, and age appropriate screening tests were ordered as noted in patient's After Visit Summary  Personalized health advice and appropriate referrals for health education or preventive services given if needed, as noted in patient's After Visit Summary  History of Present Illness:     Patient presents for Medicare Annual Wellness visit    Patient Care Team:  Michelet Arguelles MD as PCP - General  MD Gualberto Nowak MD     Problem List:     Patient Active Problem List   Diagnosis    Dyslipidemia    Essential hypertension    Type 2 diabetes mellitus without complication, without long-term current use of insulin (HCC)    Chronic anemia    Lump in neck    Sialadenitis      Past Medical and Surgical History:     Past Medical History:   Diagnosis Date    Anxiety     Depression     Diabetes mellitus ([facility] )     Fibroid     3 2    Hypertension     Rheumatoid arthritis ([facility] )     rt knee, lt ankle     Past Surgical History:   Procedure Laterality Date    COLONOSCOPY  2013, 2017    DILATION AND CURETTAGE OF UTERUS      ORIF ANKLE FRACTURE Left     TONSILLECTOMY      TUBAL LIGATION      WISDOM TOOTH EXTRACTION        Family History:     Family History   Problem Relation Age of Onset    Alzheimer's disease Brother 80    Cancer Brother         malignant tumor pharynx      Social History:        Social History     Socioeconomic History    Marital status:       Spouse name: Not on file    Number of children: Not on file    Years of education: Not on file    Highest education level: Not on file   Occupational History    Occupation: retired   Social Needs    Financial resource strain: Not on file    Food insecurity     Worry: Not on file     Inability: Not on file   [employer] needs     Medical: Not on file     Non-medical: Not on file   Tobacco Use    Smoking status: Former Smoker     Packs/day: 0 50    Smokeless tobacco: Never Used    Tobacco comment: quit    Substance and Sexual Activity    Alcohol use: Yes     Comment: occasional    Drug use: Never    Sexual activity: Not on file   Lifestyle    Physical activity     Days per week: Not on file     Minutes per session: Not on file    Stress: Not on file   Relationships    Social connections     Talks on phone: Not on file     Gets together: Not on file     Attends Latter-day service: Not on file     Active member of club or organization: Not on file     Attends meetings of clubs or organizations: Not on file     Relationship status: Not on file    Intimate partner violence     Fear of current or ex partner: Not on file     Emotionally abused: Not on file     Physically abused: Not on file     Forced sexual activity: Not on file   Other Topics Concern    Not on file   Social History Narrative    · Most recent tobacco use screenin2019      · Do you currently or have you served in [employer]:   No      · Occupation:   retired      ·· Caffeine intake:   None      ·· Marital status:          · Advance directive:   No      · Seat belts used routinely:   Yes      · Sexual orientation:   Heterosexual      · General stress level:   Medium      · Guns present in home:   No      Last modified by [id number]   2019, 03:30         · Date of LMP:       · Date of last pap smear:   2016 - neg      · Menses monthly:   No      · Age at first child:   none     · Age at menarche:   7      · Current birth control method:   Menopause      · If post menopausal, age at menopause:   47      · [place]:   16           Medications and Allergies:     Current Outpatient Medications   Medication Sig Dispense Refill    amLODIPine (NORVASC) 2 5 mg tablet Take 1 tablet (2 5 mg total) by mouth daily 90 tablet 1    aspirin (Aspirin 81) 81 mg EC tablet Take 1 capsule by mouth daily      atorvastatin (LIPITOR) 10 mg tablet Take 1 tablet (10 mg total) by mouth daily 90 tablet 1    glucose blood test strip Use 1 each daily One Touch Ultra Blue 100 each 3    losartan-hydrochlorothiazide (HYZAAR) 100-12 5 MG per tablet Take 0 5 tablets by mouth daily 90 tablet 1    metFORMIN (GLUCOPHAGE-XR) 500 mg 24 hr tablet TAKE 1 TABLET(500 MG) BY MOUTH TWICE DAILY 180 tablet 1    metoprolol succinate (TOPROL-XL) 25 mg 24 hr tablet Take 1 tablet (25 mg total) by mouth daily 90 tablet 1    potassium chloride (K-DUR,KLOR-CON) 10 mEq tablet Take 1 tablet (10 mEq total) by mouth daily 90 tablet 1    traMADol (ULTRAM) 50 mg tablet Take 50 mg by mouth daily       No current facility-administered medications for this visit  Allergies   Allergen Reactions    Shellfish Allergy Anaphylaxis    Shellfish-Derived Products     Other Rash      Immunizations:     Immunization History   Administered Date(s) Administered    Influenza, high dose seasonal 0 7 mL 11/02/2020    SARS-CoV-2 / COVID-19 mRNA IM ([employer]) 01/28/2021, 02/24/2021      Health Maintenance: There are no preventive care reminders to display for this patient  Topic Date Due    DTaP,Tdap,and Td Vaccines (1 - Tdap) 07/02/1966    Pneumococcal Vaccine: 65+ Years (1 of 1 - PPSV23) 07/02/2009      Medicare Health Risk Assessment:     Pulse 60   Temp 97 9 °F (36 6 °C) (Tympanic)   Resp 18   Ht 5' 2" (1 575 m)   Wt 65 8 kg (145 lb)   SpO2 98%   BMI 26 52 kg/m²      Donavon Francois is here for her Initial Wellness visit  Health Risk Assessment:   Patient rates overall health as good  Patient feels that their physical health rating is slightly worse  Eyesight was rated as same  Hearing was rated as same  Patient feels that their emotional and mental health rating is same  Pain experienced in the last 7 days has been none  Patient states that she has experienced no weight loss or gain in last 6 months       Depression Screening:   PHQ-2 Score: 0      Fall Risk Screening: In the past year, patient has experienced: no history of falling in past year      Urinary Incontinence Screening:   Patient has not leaked urine accidently in the last six months  Home Safety:  Patient has trouble with stairs inside or outside of their home  Patient has working smoke alarms and has working carbon monoxide detector  Home safety hazards include: none  Nutrition:   Current diet is Regular  Medications:   Patient is not currently taking any over-the-counter supplements  Patient is able to manage medications  Activities of Daily Living (ADLs)/Instrumental Activities of Daily Living (IADLs):   Walk and transfer into and out of bed and chair?: Yes  Dress and groom yourself?: Yes    Bathe or shower yourself?: Yes    Feed yourself?  Yes  Do your laundry/housekeeping?: Yes  Manage your money, pay your bills and track your expenses?: Yes  Make your own meals?: Yes    Do your own shopping?: Yes    Previous Hospitalizations:   Any hospitalizations or ED visits within the last 12 months?: No      Advance Care Planning:   Living will: No    Durable POA for healthcare: No    Advanced directive: No    Advanced directive counseling given: Yes    Five wishes given: Yes    Patient declined ACP directive: No    End of Life Decisions reviewed with patient: Yes    Provider agrees with end of life decisions: Yes      Cognitive Screening:   Provider or family/friend/caregiver concerned regarding cognition?: No    PREVENTIVE SCREENINGS      Cardiovascular Screening:    General: Screening Current      Diabetes Screening:     General: Screening Not Indicated, History Diabetes and Risks and Benefits Discussed      Colorectal Cancer Screening:       Due for: Colonoscopy - Low Risk      Breast Cancer Screening:     General: Risks and Benefits Discussed      Cervical Cancer Screening:    General: Risks and Benefits Discussed      Osteoporosis Screening: General: Risks and Benefits Discussed      Abdominal Aortic Aneurysm (AAA) Screening:        General: Screening Not Indicated      Lung Cancer Screening:     General: Screening Not Indicated      Hepatitis C Screening:    General: Risks and Benefits Discussed    Screening, Brief Intervention, and Referral to Treatment (SBIRT)    Screening  Typical number of drinks in a day: 0  Typical number of drinks in a week: 2      Cm Jenkins MD

## 2021-03-09 NOTE — ASSESSMENT & PLAN NOTE
Lab Results   Component Value Date    HGBA1C 6 7 (H) 09/01/2020   · HbA1c at goal : 6 7   · Patient is on metformin 500 mg PO daily   Compliant, experiences no side effects

## 2021-03-11 DIAGNOSIS — E11.9 TYPE 2 DIABETES MELLITUS WITHOUT COMPLICATION, WITHOUT LONG-TERM CURRENT USE OF INSULIN (HCC): ICD-10-CM

## 2021-03-12 ENCOUNTER — HOSPITAL ENCOUNTER (OUTPATIENT)
Dept: ULTRASOUND IMAGING | Facility: HOSPITAL | Age: 76
Discharge: HOME | End: 2021-03-12
Payer: MEDICARE

## 2021-03-12 ENCOUNTER — APPOINTMENT (OUTPATIENT)
Dept: LAB | Facility: HOSPITAL | Age: 76
End: 2021-03-12
Payer: MEDICARE

## 2021-03-12 DIAGNOSIS — R10.84 GENERALIZED ABDOMINAL PAIN: ICD-10-CM

## 2021-03-12 LAB
ALBUMIN SERPL BCP-MCNC: 4.3 G/DL (ref 3.4–4.8)
ALP SERPL-CCNC: 89.9 U/L (ref 35–140)
ALT SERPL W P-5'-P-CCNC: 15 U/L (ref 5–54)
ANION GAP SERPL CALCULATED.3IONS-SCNC: 6 MMOL/L (ref 4–13)
AST SERPL W P-5'-P-CCNC: 16 U/L (ref 15–41)
BASOPHILS # BLD AUTO: 0.06 THOUSANDS/ΜL (ref 0–0.1)
BASOPHILS NFR BLD AUTO: 1 % (ref 0–1)
BILIRUB SERPL-MCNC: 0.51 MG/DL (ref 0.3–1.2)
BUN SERPL-MCNC: 21 MG/DL (ref 6–20)
CALCIUM SERPL-MCNC: 10.2 MG/DL (ref 8.4–10.2)
CHLORIDE SERPL-SCNC: 100 MMOL/L (ref 96–108)
CO2 SERPL-SCNC: 31 MMOL/L (ref 22–33)
CREAT SERPL-MCNC: 0.78 MG/DL (ref 0.4–1.1)
EOSINOPHIL # BLD AUTO: 0.3 THOUSAND/ΜL (ref 0–0.61)
EOSINOPHIL NFR BLD AUTO: 4 % (ref 0–6)
ERYTHROCYTE [DISTWIDTH] IN BLOOD BY AUTOMATED COUNT: 16.4 % (ref 11.6–15.1)
EST. AVERAGE GLUCOSE BLD GHB EST-MCNC: 166 MG/DL
GFR SERPL CREATININE-BSD FRML MDRD: 85 ML/MIN/1.73SQ M
GLUCOSE P FAST SERPL-MCNC: 153 MG/DL (ref 70–105)
HBA1C MFR BLD: 7.4 %
HCT VFR BLD AUTO: 34.6 % (ref 34.8–46.1)
HGB BLD-MCNC: 10.6 G/DL (ref 11.5–15.4)
IMM GRANULOCYTES # BLD AUTO: 0.02 THOUSAND/UL (ref 0–0.2)
IMM GRANULOCYTES NFR BLD AUTO: 0 % (ref 0–2)
LYMPHOCYTES # BLD AUTO: 2.77 THOUSANDS/ΜL (ref 0.6–4.47)
LYMPHOCYTES NFR BLD AUTO: 32 % (ref 14–44)
MCH RBC QN AUTO: 19.7 PG (ref 26.8–34.3)
MCHC RBC AUTO-ENTMCNC: 30.6 G/DL (ref 31.4–37.4)
MCV RBC AUTO: 64 FL (ref 82–98)
MONOCYTES # BLD AUTO: 0.71 THOUSAND/ΜL (ref 0.17–1.22)
MONOCYTES NFR BLD AUTO: 8 % (ref 4–12)
NEUTROPHILS # BLD AUTO: 4.68 THOUSANDS/ΜL (ref 1.85–7.62)
NEUTS SEG NFR BLD AUTO: 55 % (ref 43–75)
PLATELET # BLD AUTO: 380 THOUSANDS/UL (ref 149–390)
PMV BLD AUTO: 9.2 FL (ref 8.9–12.7)
POTASSIUM SERPL-SCNC: 4.2 MMOL/L (ref 3.5–5)
PROT SERPL-MCNC: 7.9 G/DL (ref 6.4–8.3)
RBC # BLD AUTO: 5.38 MILLION/UL (ref 3.81–5.12)
SODIUM SERPL-SCNC: 137 MMOL/L (ref 133–145)
WBC # BLD AUTO: 8.54 THOUSAND/UL (ref 4.31–10.16)

## 2021-03-12 PROCEDURE — 80053 COMPREHEN METABOLIC PANEL: CPT

## 2021-03-12 PROCEDURE — 83036 HEMOGLOBIN GLYCOSYLATED A1C: CPT

## 2021-03-12 PROCEDURE — 36415 COLL VENOUS BLD VENIPUNCTURE: CPT

## 2021-03-12 PROCEDURE — 85025 COMPLETE CBC W/AUTO DIFF WBC: CPT

## 2021-03-12 PROCEDURE — 76700 US EXAM ABDOM COMPLETE: CPT

## 2021-03-16 ENCOUNTER — TELEPHONE (OUTPATIENT)
Dept: FAMILY MEDICINE CLINIC | Facility: CLINIC | Age: 76
End: 2021-03-16

## 2021-03-18 DIAGNOSIS — D50.8 IRON DEFICIENCY ANEMIA SECONDARY TO INADEQUATE DIETARY IRON INTAKE: Primary | ICD-10-CM

## 2021-03-18 RX ORDER — IRON PS COMPLEX/B12/FOLIC ACID 150-25-1
1 CAPSULE ORAL DAILY
Qty: 30 CAPSULE | Refills: 2 | Status: SHIPPED | OUTPATIENT
Start: 2021-03-18 | End: 2021-04-17

## 2021-03-30 DIAGNOSIS — Z79.4 TYPE 2 DIABETES MELLITUS WITHOUT COMPLICATION, WITH LONG-TERM CURRENT USE OF INSULIN (HCC): Primary | ICD-10-CM

## 2021-03-30 DIAGNOSIS — E11.9 TYPE 2 DIABETES MELLITUS WITHOUT COMPLICATION, WITH LONG-TERM CURRENT USE OF INSULIN (HCC): Primary | ICD-10-CM

## 2021-03-30 RX ORDER — LANCETS 30 GAUGE
EACH MISCELLANEOUS
Qty: 100 EACH | Refills: 5 | Status: SHIPPED | OUTPATIENT
Start: 2021-03-30

## 2021-03-30 RX ORDER — LANCETS 30 GAUGE
EACH MISCELLANEOUS 2 TIMES DAILY
COMMUNITY
End: 2021-03-30 | Stop reason: SDUPTHER

## 2021-04-01 DIAGNOSIS — K11.20: Primary | ICD-10-CM

## 2021-04-05 DIAGNOSIS — R10.84 GENERALIZED ABDOMINAL PAIN: ICD-10-CM

## 2021-04-05 RX ORDER — PANTOPRAZOLE SODIUM 40 MG/1
40 TABLET, DELAYED RELEASE ORAL DAILY
Qty: 90 TABLET | Refills: 1 | Status: SHIPPED | OUTPATIENT
Start: 2021-04-05 | End: 2021-06-08 | Stop reason: SDUPTHER

## 2021-05-20 ENCOUNTER — OFFICE VISIT (OUTPATIENT)
Dept: FAMILY MEDICINE CLINIC | Facility: CLINIC | Age: 76
End: 2021-05-20
Payer: MEDICARE

## 2021-05-20 VITALS
TEMPERATURE: 97.2 F | HEIGHT: 62 IN | DIASTOLIC BLOOD PRESSURE: 72 MMHG | OXYGEN SATURATION: 98 % | WEIGHT: 142 LBS | HEART RATE: 68 BPM | BODY MASS INDEX: 26.13 KG/M2 | SYSTOLIC BLOOD PRESSURE: 138 MMHG | RESPIRATION RATE: 16 BRPM

## 2021-05-20 DIAGNOSIS — M54.2 NECK PAIN: Primary | ICD-10-CM

## 2021-05-20 PROCEDURE — 99214 OFFICE O/P EST MOD 30 MIN: CPT | Performed by: INTERNAL MEDICINE

## 2021-05-20 NOTE — PROGRESS NOTES
Assessment/Plan:    Neck pain  Complained of neck pain, worse on movement/  Also may be related to  On and off sialoadenitis  Current tender  CT neck soft tissue with contrast ordered  Cervical spine xray  Physical therapy ordered  Right knee  And ankle osteoarthritis  Continue tramadol   Robert Simple No problem-specific Assessment & Plan notes found for this encounter  Diagnoses and all orders for this visit:    Neck pain  -     Cancel: XR neck soft tissue; Future  -     Ambulatory referral to Physical Therapy; Future  -     XR spine cervical 2 or 3 vw injury; Future  -     CT soft tissue neck w contrast; Future          Subjective:      Patient ID: Yane Client is a 67 y o  female with hypertension, HLD, type 2 diabetes   HPI  Neck pain started 4-5 months  - more of a discomfort worse on turning to the left, radiates down shoulder and back  Feels it started after she leaned a lot on the left side reading magazine  Denied any fall or trauma  Intermittent and worse on moving neck from side to side  Assoc with stiffness on the right  occasional has swelling in the right submandibular gland  X 3 months   Seeing ENT for this  Right knee pain , chronic,affects his movement  Present for about 6 months, slightly worsening  Worse at the end of the day after prolonged use  Denied crepitus  Had similar history of osteoarthritis in right knee  Occasional feels weakness in the knee    Back pain and right hip pain  Felt better physical therapy after leg strengthening exercises  left ankle pain and swelling  History of left ankle #  For diabetes : tries to be compliant with diabetic diet but defaults at times  Blood sugar home monitoring at home - 124-145 mg/dl  Complaint with metformin  Occasional diarrhea  Some sore wound on the lips    The following portions of the patient's history were reviewed and updated as appropriate: allergies, current medications, past family history, past medical history, past social history, past surgical history and problem list     Review of Systems   Constitutional: Negative for chills, fever and unexpected weight change  HENT: Positive for postnasal drip  Negative for sinus pressure, sinus pain and sore throat  Respiratory: Negative for apnea, cough, choking, shortness of breath and wheezing  Cardiovascular: Positive for leg swelling  Negative for chest pain and palpitations  Gastrointestinal: Negative for abdominal distention, blood in stool, constipation, diarrhea (occ from metformin), nausea and vomiting  Genitourinary: Negative for dyspareunia, frequency and urgency  Musculoskeletal: Positive for arthralgias, back pain, gait problem, joint swelling (left ankle), neck pain and neck stiffness  Negative for myalgias  Neurological: Negative for dizziness, weakness, light-headedness and headaches  Psychiatric/Behavioral: Negative for confusion and hallucinations  Objective:      /72 (BP Location: Left arm, Patient Position: Sitting, Cuff Size: Standard)   Pulse 68   Temp (!) 97 2 °F (36 2 °C) (Temporal)   Resp 16   Ht 5' 2" (1 575 m)   Wt 64 4 kg (142 lb)   SpO2 98%   BMI 25 97 kg/m²          Physical Exam  Constitutional:       General: She is not in acute distress  Appearance: Normal appearance  She is not ill-appearing, toxic-appearing or diaphoretic  HENT:      Head: Normocephalic and atraumatic  Nose: Nose normal       Mouth/Throat:      Mouth: Mucous membranes are moist       Pharynx: No oropharyngeal exudate  Comments: Clear sore in upper lip  Eyes:      Extraocular Movements: Extraocular movements intact  Pupils: Pupils are equal, round, and reactive to light  Neck:      Musculoskeletal: Normal range of motion  Comments: Some stiffness in neck movement, submandibular tenderness   Cardiovascular:      Rate and Rhythm: Normal rate and regular rhythm  Heart sounds: No murmur     Pulmonary:      Effort: Pulmonary effort is normal  No respiratory distress  Breath sounds: Normal breath sounds  No wheezing, rhonchi or rales  Chest:      Chest wall: No tenderness  Abdominal:      General: Abdomen is flat  Palpations: Abdomen is soft  Musculoskeletal:         General: Tenderness (left ankle ) present  Left lower leg: Edema present  Skin:     General: Skin is warm and dry  Capillary Refill: Capillary refill takes less than 2 seconds  Neurological:      General: No focal deficit present  Mental Status: She is alert and oriented to person, place, and time  Cranial Nerves: No cranial nerve deficit  Sensory: No sensory deficit  Motor: No weakness        Coordination: Coordination normal       Deep Tendon Reflexes: Reflexes normal    Psychiatric:         Mood and Affect: Mood normal          Behavior: Behavior normal

## 2021-05-27 ENCOUNTER — HOSPITAL ENCOUNTER (OUTPATIENT)
Dept: CT IMAGING | Facility: HOSPITAL | Age: 76
Discharge: HOME | End: 2021-05-27
Payer: MEDICARE

## 2021-05-27 ENCOUNTER — HOSPITAL ENCOUNTER (OUTPATIENT)
Dept: RADIOLOGY | Facility: HOSPITAL | Age: 76
Discharge: HOME | End: 2021-05-27
Payer: MEDICARE

## 2021-05-27 DIAGNOSIS — M54.2 NECK PAIN: ICD-10-CM

## 2021-05-27 PROCEDURE — 72040 X-RAY EXAM NECK SPINE 2-3 VW: CPT

## 2021-05-27 PROCEDURE — G1004 CDSM NDSC: HCPCS

## 2021-05-27 PROCEDURE — 70491 CT SOFT TISSUE NECK W/DYE: CPT

## 2021-05-27 RX ADMIN — IOHEXOL 100 ML: 350 INJECTION, SOLUTION INTRAVENOUS at 11:23

## 2021-05-28 DIAGNOSIS — E11.9 TYPE 2 DIABETES MELLITUS WITHOUT COMPLICATION, WITHOUT LONG-TERM CURRENT USE OF INSULIN (HCC): ICD-10-CM

## 2021-05-30 RX ORDER — METFORMIN HYDROCHLORIDE 500 MG/1
500 TABLET, EXTENDED RELEASE ORAL 2 TIMES DAILY WITH MEALS
Qty: 180 TABLET | Refills: 2 | Status: SHIPPED | OUTPATIENT
Start: 2021-05-30 | End: 2022-02-28

## 2021-06-30 ENCOUNTER — TELEPHONE (OUTPATIENT)
Dept: FAMILY MEDICINE CLINIC | Facility: CLINIC | Age: 76
End: 2021-06-30

## 2021-06-30 NOTE — TELEPHONE ENCOUNTER
Pt called and stated that she was given a script for PT for shoulder,neck and back   She stated that she would like to have one for her knee and leg   Cleopatra Mort

## 2021-07-01 DIAGNOSIS — I10 ESSENTIAL HYPERTENSION: ICD-10-CM

## 2021-07-01 DIAGNOSIS — E78.5 DYSLIPIDEMIA: ICD-10-CM

## 2021-07-01 DIAGNOSIS — D50.8 IRON DEFICIENCY ANEMIA SECONDARY TO INADEQUATE DIETARY IRON INTAKE: Primary | ICD-10-CM

## 2021-07-02 RX ORDER — ATORVASTATIN CALCIUM 10 MG/1
10 TABLET, FILM COATED ORAL DAILY
Qty: 90 TABLET | Refills: 1 | Status: SHIPPED | OUTPATIENT
Start: 2021-07-02 | End: 2021-10-05

## 2021-07-02 RX ORDER — METOPROLOL SUCCINATE 25 MG/1
25 TABLET, EXTENDED RELEASE ORAL DAILY
Qty: 90 TABLET | Refills: 1 | Status: SHIPPED | OUTPATIENT
Start: 2021-07-02 | End: 2021-10-05

## 2021-07-02 RX ORDER — AMLODIPINE BESYLATE 2.5 MG/1
2.5 TABLET ORAL DAILY
Qty: 90 TABLET | Refills: 1 | Status: SHIPPED | OUTPATIENT
Start: 2021-07-02 | End: 2021-12-27

## 2021-07-02 RX ORDER — IRON PS COMPLEX/B12/FOLIC ACID 150-25-1
1 CAPSULE ORAL DAILY
Qty: 90 CAPSULE | Refills: 1 | Status: SHIPPED | OUTPATIENT
Start: 2021-07-02 | End: 2021-09-30

## 2021-07-06 ENCOUNTER — TELEPHONE (OUTPATIENT)
Dept: FAMILY MEDICINE CLINIC | Facility: CLINIC | Age: 76
End: 2021-07-06

## 2021-07-06 DIAGNOSIS — M25.569 KNEE PAIN, UNSPECIFIED CHRONICITY, UNSPECIFIED LATERALITY: Primary | ICD-10-CM

## 2021-10-05 DIAGNOSIS — E78.5 DYSLIPIDEMIA: ICD-10-CM

## 2021-10-05 DIAGNOSIS — E87.6 LOW BLOOD POTASSIUM: ICD-10-CM

## 2021-10-05 DIAGNOSIS — I10 ESSENTIAL HYPERTENSION: ICD-10-CM

## 2021-10-05 RX ORDER — POTASSIUM CHLORIDE 750 MG/1
TABLET, EXTENDED RELEASE ORAL
Qty: 90 TABLET | Refills: 1 | Status: SHIPPED | OUTPATIENT
Start: 2021-10-05 | End: 2022-06-22

## 2021-10-05 RX ORDER — ATORVASTATIN CALCIUM 10 MG/1
TABLET, FILM COATED ORAL
Qty: 90 TABLET | Refills: 1 | Status: SHIPPED | OUTPATIENT
Start: 2021-10-05 | End: 2022-06-22

## 2021-10-05 RX ORDER — METOPROLOL SUCCINATE 25 MG/1
TABLET, EXTENDED RELEASE ORAL
Qty: 90 TABLET | Refills: 1 | Status: SHIPPED | OUTPATIENT
Start: 2021-10-05

## 2021-10-07 DIAGNOSIS — I10 ESSENTIAL HYPERTENSION: ICD-10-CM

## 2021-10-07 RX ORDER — LOSARTAN POTASSIUM AND HYDROCHLOROTHIAZIDE 12.5; 1 MG/1; MG/1
0.5 TABLET ORAL DAILY
Qty: 90 TABLET | Refills: 1 | Status: SHIPPED | OUTPATIENT
Start: 2021-10-07 | End: 2021-10-25

## 2021-10-25 ENCOUNTER — OFFICE VISIT (OUTPATIENT)
Dept: FAMILY MEDICINE CLINIC | Facility: CLINIC | Age: 76
End: 2021-10-25
Payer: MEDICARE

## 2021-10-25 VITALS
SYSTOLIC BLOOD PRESSURE: 132 MMHG | TEMPERATURE: 97.9 F | HEART RATE: 88 BPM | DIASTOLIC BLOOD PRESSURE: 70 MMHG | BODY MASS INDEX: 25.58 KG/M2 | HEIGHT: 62 IN | RESPIRATION RATE: 16 BRPM | OXYGEN SATURATION: 97 % | WEIGHT: 139 LBS

## 2021-10-25 DIAGNOSIS — I10 ESSENTIAL HYPERTENSION: ICD-10-CM

## 2021-10-25 DIAGNOSIS — G89.29 CHRONIC LEFT SHOULDER PAIN: ICD-10-CM

## 2021-10-25 DIAGNOSIS — R21 SKIN RASH: ICD-10-CM

## 2021-10-25 DIAGNOSIS — E78.5 DYSLIPIDEMIA: ICD-10-CM

## 2021-10-25 DIAGNOSIS — K11.20 SIALADENITIS: ICD-10-CM

## 2021-10-25 DIAGNOSIS — M25.512 CHRONIC LEFT SHOULDER PAIN: ICD-10-CM

## 2021-10-25 DIAGNOSIS — Z23 FLU VACCINE NEED: Primary | ICD-10-CM

## 2021-10-25 DIAGNOSIS — E11.9 TYPE 2 DIABETES MELLITUS WITHOUT COMPLICATION, WITHOUT LONG-TERM CURRENT USE OF INSULIN (HCC): ICD-10-CM

## 2021-10-25 PROCEDURE — 90662 IIV NO PRSV INCREASED AG IM: CPT

## 2021-10-25 PROCEDURE — G0008 ADMIN INFLUENZA VIRUS VAC: HCPCS

## 2021-10-25 PROCEDURE — 99214 OFFICE O/P EST MOD 30 MIN: CPT | Performed by: INTERNAL MEDICINE

## 2021-10-25 RX ORDER — LOSARTAN POTASSIUM AND HYDROCHLOROTHIAZIDE 12.5; 1 MG/1; MG/1
1 TABLET ORAL DAILY
Refills: 1
Start: 2021-10-25 | End: 2021-12-01 | Stop reason: SDUPTHER

## 2021-10-25 RX ORDER — TRIAMCINOLONE ACETONIDE 1 MG/G
CREAM TOPICAL 2 TIMES DAILY
Qty: 30 G | Refills: 0 | Status: SHIPPED | OUTPATIENT
Start: 2021-10-25 | End: 2022-08-03 | Stop reason: ALTCHOICE

## 2021-11-18 ENCOUNTER — TELEPHONE (OUTPATIENT)
Dept: FAMILY MEDICINE CLINIC | Facility: CLINIC | Age: 76
End: 2021-11-18

## 2021-12-01 DIAGNOSIS — I10 ESSENTIAL HYPERTENSION: ICD-10-CM

## 2021-12-01 RX ORDER — LOSARTAN POTASSIUM AND HYDROCHLOROTHIAZIDE 12.5; 1 MG/1; MG/1
1 TABLET ORAL DAILY
Qty: 90 TABLET | Refills: 2
Start: 2021-12-01 | End: 2021-12-02 | Stop reason: SDUPTHER

## 2021-12-02 DIAGNOSIS — I10 ESSENTIAL HYPERTENSION: ICD-10-CM

## 2021-12-02 RX ORDER — LOSARTAN POTASSIUM AND HYDROCHLOROTHIAZIDE 12.5; 1 MG/1; MG/1
1 TABLET ORAL DAILY
Qty: 90 TABLET | Refills: 2 | Status: SHIPPED | OUTPATIENT
Start: 2021-12-02 | End: 2022-03-02

## 2021-12-08 ENCOUNTER — IMMUNIZATIONS (OUTPATIENT)
Dept: FAMILY MEDICINE CLINIC | Facility: HOSPITAL | Age: 76
End: 2021-12-08

## 2021-12-08 DIAGNOSIS — Z23 ENCOUNTER FOR IMMUNIZATION: Primary | ICD-10-CM

## 2021-12-08 PROCEDURE — 0064A COVID-19 MODERNA VACC 0.25 ML BOOSTER: CPT

## 2021-12-08 PROCEDURE — 91306 COVID-19 MODERNA VACC 0.25 ML BOOSTER: CPT

## 2021-12-25 DIAGNOSIS — I10 ESSENTIAL HYPERTENSION: ICD-10-CM

## 2021-12-27 RX ORDER — AMLODIPINE BESYLATE 2.5 MG/1
TABLET ORAL
Qty: 90 TABLET | Refills: 1 | Status: SHIPPED | OUTPATIENT
Start: 2021-12-27 | End: 2022-06-22

## 2022-01-20 DIAGNOSIS — D50.8 IRON DEFICIENCY ANEMIA SECONDARY TO INADEQUATE DIETARY IRON INTAKE: Primary | ICD-10-CM

## 2022-01-20 RX ORDER — IRON PS COMPLEX/B12/FOLIC ACID 150-25-1
1 CAPSULE ORAL DAILY
Qty: 90 CAPSULE | Refills: 1 | Status: SHIPPED | OUTPATIENT
Start: 2022-01-20

## 2022-01-20 RX ORDER — IRON PS COMPLEX/B12/FOLIC ACID 150-25-1
1 CAPSULE ORAL DAILY
COMMUNITY
Start: 2021-10-28 | End: 2022-01-20 | Stop reason: SDUPTHER

## 2022-02-08 ENCOUNTER — OFFICE VISIT (OUTPATIENT)
Dept: FAMILY MEDICINE CLINIC | Facility: CLINIC | Age: 77
End: 2022-02-08
Payer: MEDICARE

## 2022-02-08 VITALS
BODY MASS INDEX: 26.57 KG/M2 | TEMPERATURE: 98.3 F | DIASTOLIC BLOOD PRESSURE: 70 MMHG | HEIGHT: 62 IN | WEIGHT: 144.4 LBS | HEART RATE: 62 BPM | SYSTOLIC BLOOD PRESSURE: 116 MMHG

## 2022-02-08 DIAGNOSIS — R06.00 DYSPNEA ON EXERTION: ICD-10-CM

## 2022-02-08 DIAGNOSIS — E11.9 TYPE 2 DIABETES MELLITUS WITHOUT COMPLICATION, WITHOUT LONG-TERM CURRENT USE OF INSULIN (HCC): ICD-10-CM

## 2022-02-08 DIAGNOSIS — E83.52 HYPERCALCEMIA: Primary | ICD-10-CM

## 2022-02-08 PROCEDURE — 99214 OFFICE O/P EST MOD 30 MIN: CPT | Performed by: INTERNAL MEDICINE

## 2022-02-08 RX ORDER — CETIRIZINE HYDROCHLORIDE 10 MG/1
10 TABLET ORAL DAILY
COMMUNITY
End: 2022-08-03 | Stop reason: ALTCHOICE

## 2022-02-09 NOTE — PROGRESS NOTES
Assessment/Plan:  1  Hypertension under control hypertension under control  2  Diabetes mellitus type 2, A1c has gone up to 7 5  She is taking metformin 500 twice a day  She could not take more as she developed diarrhea  Suggested to add another medication she said no she wants to go watch her diet 1st see what the next A1c is  3  Thalassemia minor hemoglobin stable around 10  4  Hyperlipidemia continue statin  Meds and labs reviewed, serum calcium is slightly elevated therefore will check PTH and vitamin-D level to make sure she does not hyperparathyroidism  5  Chest tightness and exertional dyspnea  Electrocardiogram was done which was completely normal   However in view of her history of diabetes, hypertension and hyperlipidemia will need to rule out coronary artery disease  Therefore she has been referred to Cardiology for cardiac workup         Problem List Items Addressed This Visit        Endocrine    Type 2 diabetes mellitus without complication, without long-term current use of insulin ([facility]       Lab Results   Component Value Date    HGBA1C 7 5 02/02/2022              Other Visit Diagnoses     Hypercalcemia    -  Primary    Relevant Orders    Vitamin D 25 hydroxy    PTH, intact    Dyspnea on exertion        Relevant Orders    Ambulatory Referral to Cardiology            Subjective:      Patient ID: Kaz Murillo is a 67 y o  female  [facility] here [facility] follow-up    She has history of                                                                              Essential hypertension                                                                              Diabetes mellitus type 2                                                                              Hyperlipidemia                                                                             Thalassemia minor  He is overall doing well, she she complains of chest tightness on and off, also has shortness of breath and fatigue on exertion especially when she goes up and down the steps  No orthopnea or PND  No peripheral edema  No palpitations or lightheadedness  No heartburn  No history of reflux  No headaches or dizziness, no ear nose throat eye problems, no GI or  issues particularly no heartburn or dysphagia, no abdominal Main, no change in bowel habit, no weight loss or weight gain, no joint pains bone pains or muscle aches      The following portions of the patient's history were reviewed and updated as appropriate:   Past Medical History:  She has a past medical history of Anxiety, Depression, Diabetes mellitus ([facility] ), Fibroid, Hypertension, and [facility]  ,  _______________________________________________________________________  Medical Problems:  does not have any pertinent problems on file ,  _______________________________________________________________________  Past Surgical History:   has a past surgical history that includes Colonoscopy (2013, 2017); Dilation and curettage of uterus; ORIF ankle fracture (Left); Tonsillectomy; Tubal ligation; and Smock tooth extraction  ,  _______________________________________________________________________  Family History:  family history includes Alzheimer's disease (age of onset: 80) in her brother; Cancer in her brother ,  _______________________________________________________________________  Social History:   reports that she has quit smoking  She smoked 0 50 packs per day  She has never used smokeless tobacco  She reports current alcohol use  She reports that she does not use drugs  ,  _______________________________________________________________________  Allergies:  is allergic to shellfish allergy - food allergy, shellfish-derived products - food allergy, and other     _______________________________________________________________________  Current Outpatient Medications   Medication Sig Dispense Refill    amLODIPine (NORVASC) 2 5 mg tablet TAKE 1 TABLET(2 5 MG) BY MOUTH DAILY 90 tablet 1    aspirin (Aspirin 81) 81 mg EC tablet Take 1 capsule by mouth daily      atorvastatin (LIPITOR) 10 mg tablet TAKE 1 TABLET(10 MG) BY MOUTH DAILY 90 tablet 1    cetirizine (ZyrTEC) 10 mg tablet Take 10 mg by mouth daily      clobetasol (TEMOVATE) 0 05 % cream APPLY 1 APPLICATION TO THE RIGHT FOOT AS DIRECTED TWICE DAILY      Ferrex 150 Forte 150-1-25 MG-MG-MCG CAPS Take 1 capsule by mouth daily 90 capsule 1    glucose blood test strip Use 1 each daily      glucose blood test strip Use 1 each daily One Touch Ultra Blue 100 each 3    losartan-hydrochlorothiazide (HYZAAR) 100-12 5 MG per tablet Take 1 tablet by mouth daily 90 tablet 2    Lumigan 0 01 % ophthalmic drops PLACE 1 DROP IN BOTH EYES DAILY AT BEDTIME      metoprolol succinate (TOPROL-XL) 25 mg 24 hr tablet TAKE 1 TABLET(25 MG) BY MOUTH DAILY 90 tablet 1    mometasone (ELOCON) 0 1 % lotion Apply topically daily as needed (Ear Itch) 60 mL 0    OneTouch Delica Lancets 83V MISC Test twice daily 100 each 5    potassium chloride (K-DUR,KLOR-CON) 10 mEq tablet TAKE 1 TABLET(10 MEQ) BY MOUTH DAILY 90 tablet 1    timolol (BETIMOL) 0 25 % ophthalmic solution 1-2 drops 2 (two) times a day      triamcinolone (KENALOG) 0 1 % cream Apply topically 2 (two) times a day 30 g 0    erythromycin (ILOTYCIN) ophthalmic ointment APPLY EVERY DAY INTO RIGHT EYE AT BEDTIME (Patient not taking: Reported on 8/16/2021)      metFORMIN (GLUCOPHAGE-XR) 500 mg 24 hr tablet Take 1 tablet (500 mg total) by mouth 2 (two) times a day with meals 180 tablet 2    pantoprazole (PROTONIX) 40 mg tablet Take 1 tablet (40 mg total) by mouth daily 90 tablet 5    traMADol (ULTRAM) 50 mg tablet Take 50 mg by mouth daily (Patient not taking: Reported on 2/8/2022 )       No current facility-administered medications for this visit      _______________________________________________________________________  Review of Systems   All other systems reviewed and are negative  Objective:  Vitals:    02/08/22 1419   BP: 116/70   BP Location: Left arm   Patient Position: Sitting   Cuff Size: Large   Pulse: 62   Temp: 98 3 °F (36 8 °C)   Weight: 65 5 kg (144 lb 6 4 oz)   Height: 5' 2" (1 575 m)     Body mass index is 26 41 kg/m²  Physical Exam  Vitals and nursing note reviewed  Constitutional:       Appearance: Normal appearance  She is well-developed  HENT:      Head: Normocephalic  Right Ear: External ear normal       Left Ear: External ear normal       Nose: Nose normal       Mouth/Throat:      Mouth: Mucous membranes are moist    Eyes:      General: No scleral icterus  Extraocular Movements: Extraocular movements intact  Conjunctiva/sclera: Conjunctivae normal       Pupils: Pupils are equal, round, and reactive to light  Neck:      Thyroid: No thyroid mass or thyromegaly  Vascular: No carotid bruit or JVD  Trachea: Trachea normal    Cardiovascular:      Rate and Rhythm: Normal rate and regular rhythm  Pulses: Normal pulses  Heart sounds: Normal heart sounds  No murmur heard  Pulmonary:      Effort: Pulmonary effort is normal       Breath sounds: Normal breath sounds  Abdominal:      General: Bowel sounds are normal  There is no distension  Palpations: Abdomen is soft  There is no mass  Tenderness: There is no abdominal tenderness  Hernia: No hernia is present  Musculoskeletal:         General: No deformity  Normal range of motion  Cervical back: Full passive range of motion without pain, normal range of motion and neck supple  No edema  Left lower leg: No edema  Lymphadenopathy:      Cervical: No cervical adenopathy  Skin:     General: Skin is warm  Findings: No lesion or rash  Neurological:      General: No focal deficit present  Mental Status: She is alert and oriented to person, place, and time  Cranial Nerves: No cranial nerve deficit  Sensory: No sensory deficit  Motor: No abnormal muscle tone  Coordination: Coordination normal       Deep Tendon Reflexes: Reflexes normal    Psychiatric:         Mood and Affect: Mood normal          Behavior: Behavior normal          Thought Content:  Thought content normal

## 2022-02-14 ENCOUNTER — TELEPHONE (OUTPATIENT)
Dept: FAMILY MEDICINE CLINIC | Facility: CLINIC | Age: 77
End: 2022-02-14

## 2022-02-14 NOTE — TELEPHONE ENCOUNTER
Patient had called and would like results from 02/02/2022 had done at [address] [facility]

## 2022-02-16 NOTE — TELEPHONE ENCOUNTER
Her parathyroid level is elevated and likely causing her elevated calcium level    I recommend she sees an endocrinologist   If she agrees ,let me know, I will order referral

## 2022-02-16 NOTE — TELEPHONE ENCOUNTER
Her parathyroid hormone level is elevated  Her calcium is likely elevated due to this  I recommend her to see an endocrinologist   If she agrees, let me know  I will place an order

## 2022-02-17 NOTE — TELEPHONE ENCOUNTER
Called and spoke to patient gave results and patient is ok with seeing endocrinologist  Informed will call patient on Tuesday with information  Is ok with this     [facility]

## 2022-02-24 DIAGNOSIS — E83.52 HYPERCALCEMIA: Primary | ICD-10-CM

## 2022-02-25 ENCOUNTER — TELEPHONE (OUTPATIENT)
Dept: INTERNAL MEDICINE CLINIC | Facility: CLINIC | Age: 77
End: 2022-02-25

## 2022-02-25 NOTE — TELEPHONE ENCOUNTER
Patient is still waiting for Endocrinology to call her for an appt  Also, wants to know how serious her calcium level is - does she need to be seen soon?

## 2022-02-28 ENCOUNTER — TELEPHONE (OUTPATIENT)
Dept: ENDOCRINOLOGY | Facility: CLINIC | Age: 77
End: 2022-02-28

## 2022-02-28 DIAGNOSIS — E11.9 TYPE 2 DIABETES MELLITUS WITHOUT COMPLICATION, WITHOUT LONG-TERM CURRENT USE OF INSULIN (HCC): ICD-10-CM

## 2022-02-28 RX ORDER — ASPIRIN 81 MG
TABLET,CHEWABLE ORAL
COMMUNITY

## 2022-02-28 RX ORDER — ATORVASTATIN CALCIUM 10 MG/1
TABLET, FILM COATED ORAL
COMMUNITY

## 2022-02-28 RX ORDER — TRAMADOL HYDROCHLORIDE 50 MG/1
TABLET ORAL
COMMUNITY
End: 2022-08-03 | Stop reason: ALTCHOICE

## 2022-02-28 RX ORDER — LOSARTAN POTASSIUM AND HYDROCHLOROTHIAZIDE 12.5; 1 MG/1; MG/1
TABLET ORAL EVERY 24 HOURS
COMMUNITY

## 2022-02-28 RX ORDER — METFORMIN HYDROCHLORIDE 500 MG/1
TABLET, EXTENDED RELEASE ORAL
Qty: 180 TABLET | Refills: 2 | Status: SHIPPED | OUTPATIENT
Start: 2022-02-28

## 2022-02-28 RX ORDER — TIMOLOL MALEATE 5 MG/ML
1 SOLUTION/ DROPS OPHTHALMIC DAILY
COMMUNITY
Start: 2022-02-15

## 2022-03-01 NOTE — TELEPHONE ENCOUNTER
Patient is wondering if she should stop her muti-vitamin because it has calcium in it? ? She is also wondering if waiting until June will be okay with the calcium in her blood

## 2022-03-01 NOTE — TELEPHONE ENCOUNTER
Call patient back 2/28/22 to let her know that are schedule  was out until June  Offer her offices but their schedules are far out too  But I will continue  to look for  her

## 2022-03-02 ENCOUNTER — TELEPHONE (OUTPATIENT)
Dept: FAMILY MEDICINE CLINIC | Facility: CLINIC | Age: 77
End: 2022-03-02

## 2022-03-04 ENCOUNTER — OFFICE VISIT (OUTPATIENT)
Dept: CARDIOLOGY CLINIC | Facility: CLINIC | Age: 77
End: 2022-03-04
Payer: MEDICARE

## 2022-03-04 VITALS
DIASTOLIC BLOOD PRESSURE: 70 MMHG | OXYGEN SATURATION: 99 % | HEART RATE: 56 BPM | SYSTOLIC BLOOD PRESSURE: 130 MMHG | WEIGHT: 138 LBS | BODY MASS INDEX: 25.4 KG/M2 | HEIGHT: 62 IN

## 2022-03-04 DIAGNOSIS — E78.5 DYSLIPIDEMIA: ICD-10-CM

## 2022-03-04 DIAGNOSIS — I10 ESSENTIAL HYPERTENSION: ICD-10-CM

## 2022-03-04 DIAGNOSIS — R06.00 DYSPNEA ON EXERTION: ICD-10-CM

## 2022-03-04 DIAGNOSIS — R07.89 CHEST PRESSURE: Primary | ICD-10-CM

## 2022-03-04 PROCEDURE — 93000 ELECTROCARDIOGRAM COMPLETE: CPT | Performed by: INTERNAL MEDICINE

## 2022-03-04 PROCEDURE — 99204 OFFICE O/P NEW MOD 45 MIN: CPT | Performed by: INTERNAL MEDICINE

## 2022-03-04 NOTE — PATIENT INSTRUCTIONS
You were seen today in the Cardiology office for chest pain  I have ordered a medication stress test and we will call you with the results  Thank you for choosing [facility]  Please call our office or use [facility] with any questions

## 2022-03-04 NOTE — PROGRESS NOTES
[facility]    CHIEF COMPLAINT:   Chief Complaint   Patient presents with    New Patient Visit    Shortness of Breath    Chest Pain     burning        HPI:  Jc Sanchez is a 67 y o  female with a past medical history of hypertension, dyslipidemia, diabetes mellitus, former smoker who presents today with complaints of chest discomfort and shortness of breath  New symptoms over the past 6-9 months  Symptoms usually occur at night just before bed time  No particular stressors or anxiety  Describes this as central and L chest pressure  No associated symptoms  No radiation of discomfort  Occurs 2-3x per week  Moving position may actually help with the pain  Physical activity primarily limited due to R knee pain  Also complaints of shortness of breath which can occur on exertion  Denies any fever, chills, lightheadedness, syncope, orthopnea, PND, N/V, bleeding  Does complain of fatigue which she feels is new/ Some intermittent swelling of the left ankle at the end of the day  The following portions of the patient's history were reviewed and updated as appropriate: allergies, current medications, past family history, past medical history, past social history, past surgical history, and problem list     SINCE LAST OV I REVIEWED WITH THE PATIENT THE INTERIM LABS, TEST RESULTS, CONSULTANT(S) NOTES AND PERFORMED AN INTERIM REVIEW OF HISTORY    Past Medical History:   Diagnosis Date    Anxiety     Depression     Diabetes mellitus ([facility] )     Fibroid     3 2    Hypertension     Rheumatoid arthritis ([facility] )     rt knee, lt ankle       Past Surgical History:   Procedure Laterality Date    COLONOSCOPY  2013, 2017    DILATION AND CURETTAGE OF UTERUS      ORIF ANKLE FRACTURE Left     TONSILLECTOMY      TUBAL LIGATION      WISDOM TOOTH EXTRACTION         Social History     Socioeconomic History    Marital status:       Spouse name: Not on file    Number of children: Not on file    Years of education: Not on file    Highest education level: Not on file   Occupational History    Occupation: retired   Tobacco Use    Smoking status: Former Smoker     Packs/day: 0 50    Smokeless tobacco: Never Used    Tobacco comment: quit    Substance and Sexual Activity    Alcohol use: Yes     Comment: occasional    Drug use: Never    Sexual activity: Not on file   Other Topics Concern    Not on file   Social History Narrative    · Most recent tobacco use screenin2019      · Do you currently or have you served in [employer]:   No      · Occupation:   retired      ·· Caffeine intake:   None      ·· Marital status:          · Advance directive:   No      · Seat belts used routinely:   Yes      · Sexual orientation:   Heterosexual      · General stress level:   Medium      · Guns present in home:   No      Last modified by [id number]   2019, 03:30         · Date of LMP:       · Date of last pap smear:   2016 - neg      · Menses monthly:   No      · Age at first child:   none     · Age at menarche:   7      · Current birth control method:   Menopause      · If post menopausal, age at menopause:   47      · [place]:   15          Social Determinants of Health     Financial Resource Strain: Not on file   Food Insecurity: Not on file   Transportation Needs: Not on file   Physical Activity: Not on file   Stress: Not on file   Social Connections: Not on file   Intimate Partner Violence: Not on file   Housing Stability: Not on file       Family History   Problem Relation Age of Onset    Alzheimer's disease Brother 80    Cancer Brother         malignant tumor pharynx       Allergies   Allergen Reactions    Shellfish Allergy - Food Allergy Anaphylaxis    Shellfish-Derived Products - Food Allergy     Other Rash     Other reaction(s): Unknown       Current Outpatient Medications   Medication Sig Dispense Refill    amLODIPine (NORVASC) 2 5 mg tablet TAKE 1 TABLET(2 5 MG) BY MOUTH DAILY 90 tablet 1    aspirin (Aspirin 81) 81 mg EC tablet Take 1 capsule by mouth daily      atorvastatin (LIPITOR) 10 mg tablet TAKE 1 TABLET(10 MG) BY MOUTH DAILY 90 tablet 1    atorvastatin (LIPITOR) 10 mg tablet TK 1 T PO QD      cetirizine (ZyrTEC) 10 mg tablet Take 10 mg by mouth daily      clobetasol (TEMOVATE) 0 05 % cream APPLY 1 APPLICATION TO THE RIGHT FOOT AS DIRECTED TWICE DAILY      Diclofenac Sodium (Voltaren) 1 % Apply topically      Ferrex 150 Forte 150-1-25 MG-MG-MCG CAPS Take 1 capsule by mouth daily 90 capsule 1    glucose blood test strip Use 1 each daily      glucose blood test strip Use 1 each daily One Touch Ultra Blue 100 each 3    Latanoprost 0 005 % EMUL INT 1 GTT IN OS QPM      losartan-hydrochlorothiazide (HYZAAR) 100-12 5 MG per tablet every 24 hours      Lumigan 0 01 % ophthalmic drops PLACE 1 DROP IN BOTH EYES DAILY AT BEDTIME      metFORMIN (GLUCOPHAGE-XR) 500 mg 24 hr tablet TAKE 1 TABLET(500 MG) BY MOUTH TWICE DAILY WITH MEALS 180 tablet 2    metoprolol succinate (KAPSPARGO SPRINKLE) 25 MG extended-release capsule every 24 hours      metoprolol succinate (TOPROL-XL) 25 mg 24 hr tablet TAKE 1 TABLET(25 MG) BY MOUTH DAILY 90 tablet 1    mometasone (ELOCON) 0 1 % lotion Apply topically daily as needed (Ear Itch) 60 mL 0    MULTIPLE VITAMIN-FOLIC ACID PO every 24 hours      OneTouch Delica Lancets 64Y MISC Test twice daily 100 each 5    potassium chloride (K-DUR,KLOR-CON) 10 mEq tablet TAKE 1 TABLET(10 MEQ) BY MOUTH DAILY 90 tablet 1    Propylene Glycol-Glycerin (Artificial Tears) 1-0 3 % SOLN 1 drop into affected eye as needed      timolol (BETIMOL) 0 25 % ophthalmic solution 1-2 drops 2 (two) times a day      timolol (TIMOPTIC) 0 5 % ophthalmic solution Administer 1 drop to both eyes daily      traMADol (ULTRAM) 50 mg tablet 1 tablet as needed      triamcinolone (KENALOG) 0 1 % cream Apply topically 2 (two) times a day 30 g 0    erythromycin (ILOTYCIN) ophthalmic ointment APPLY EVERY DAY INTO RIGHT EYE AT BEDTIME (Patient not taking: Reported on 8/16/2021)      losartan-hydrochlorothiazide (HYZAAR) 100-12 5 MG per tablet Take 1 tablet by mouth daily 90 tablet 2    pantoprazole (PROTONIX) 40 mg tablet Take 1 tablet (40 mg total) by mouth daily 90 tablet 5    traMADol (ULTRAM) 50 mg tablet Take 50 mg by mouth daily (Patient not taking: Reported on 2/8/2022 )       No current facility-administered medications for this visit  /70 (BP Location: Left arm, Patient Position: Sitting, Cuff Size: Standard)   Pulse 56   Ht 5' 2" (1 575 m)   Wt 62 6 kg (138 lb)   SpO2 99%   BMI 25 24 kg/m²     Review of Systems   All other systems reviewed and are negative  Physical Exam  Vitals reviewed  Constitutional:       General: She is not in acute distress  Appearance: She is well-developed and normal weight  She is not ill-appearing, toxic-appearing or diaphoretic  HENT:      Head: Normocephalic and atraumatic  Neck:      Vascular: No JVD  Cardiovascular:      Rate and Rhythm: Normal rate and regular rhythm  Heart sounds: Normal heart sounds  No murmur heard  No gallop  Pulmonary:      Effort: Pulmonary effort is normal       Breath sounds: Normal breath sounds  No wheezing, rhonchi or rales  Abdominal:      General: Bowel sounds are normal       Palpations: Abdomen is soft  Tenderness: There is no abdominal tenderness  There is no rebound  Musculoskeletal:      Right lower leg: No edema (trace)  Left lower leg: No edema (trace)  Skin:     General: Skin is warm and dry  Coloration: Skin is not cyanotic or pale  Neurological:      Mental Status: She is alert                Lab Results   Component Value Date     02/10/2015    K 4 2 03/12/2021     03/12/2021    CO2 31 03/12/2021    BUN 21 (H) 03/12/2021    CREATININE 0 78 03/12/2021    GLUCOSE 107 02/10/2015    CALCIUM 10 2 03/12/2021 ALT 15 03/12/2021    AST 16 03/12/2021       Lab Results   Component Value Date    HDL 57 09/01/2020    LDLCALC 39 09/01/2020    TRIG 66 09/01/2020       Lab Results   Component Value Date    WBC 8 54 03/12/2021    HGB 10 6 (L) 03/12/2021    HCT 34 6 (L) 03/12/2021     03/12/2021       Lab Results   Component Value Date     03/12/2021    HGBA1C 7 5 02/02/2022     Cardiac studies:   Results for orders placed or performed in visit on 03/04/22   POCT ECG    Impression    Sinus bradycardia       ASSESSMENT AND PLAN:  Courtney Sotelo was seen today for new patient visit, shortness of breath and chest pain  Diagnoses and all orders for this visit:    Chest pressure  -     NM myocardial perfusion spect (rx stress and/or rest); Future  Dyspnea on exertion  -     Ambulatory Referral to Cardiology  -     POCT ECG  -     NM myocardial perfusion spect (rx stress and/or rest); Future  #  Essential hypertension  #  Dyslipidemia  67year old female with the above mentioned past medical history who presents with complaints of chest tightness  She was well-controlled cholesterol and diabetes based off her last bloodwork  Blood pressure is at goal  Continue current regimen of amlodipine and losartan-HCTZ  She is on metoprolol succinate 25 mg daily  If fatigue persists than may consider discontinuation of this medication  Sinus bradycardia on ECG today  No red flag symptoms  She is unable to exercise so we will check a pharm NM stress to rule out obstructive CAD as the cause of her symptoms      Yasmine Mai MD

## 2022-03-31 PROCEDURE — 93000 ELECTROCARDIOGRAM COMPLETE: CPT | Performed by: INTERNAL MEDICINE

## 2022-04-15 ENCOUNTER — HOSPITAL ENCOUNTER (OUTPATIENT)
Dept: NON INVASIVE DIAGNOSTICS | Facility: CLINIC | Age: 77
Discharge: HOME | End: 2022-04-15
Payer: MEDICARE

## 2022-04-15 DIAGNOSIS — R07.89 CHEST PRESSURE: ICD-10-CM

## 2022-04-15 DIAGNOSIS — R06.00 DYSPNEA ON EXERTION: ICD-10-CM

## 2022-04-15 LAB
NUC STRESS EJECTION FRACTION: 65 %
RATE PRESSURE PRODUCT: NORMAL
SL CV REST NUCLEAR ISOTOPE DOSE: 10.5 MCI
SL CV STRESS NUCLEAR ISOTOPE DOSE: 32.9 MCI
SL CV STRESS RECOVERY BP: NORMAL MMHG
SL CV STRESS RECOVERY HR: 77 BPM
STRESS ANGINA INDEX: 0
STRESS BASELINE BP: NORMAL MMHG
STRESS BASELINE HR: 57 BPM
STRESS O2 SAT REST: 99 %
STRESS PEAK HR: 104 BPM
STRESS POST O2 SAT PEAK: 98 %
STRESS POST PEAK BP: 174 MMHG

## 2022-04-15 PROCEDURE — 78452 HT MUSCLE IMAGE SPECT MULT: CPT

## 2022-04-15 PROCEDURE — 93017 CV STRESS TEST TRACING ONLY: CPT

## 2022-04-15 PROCEDURE — 93018 CV STRESS TEST I&R ONLY: CPT | Performed by: INTERNAL MEDICINE

## 2022-04-15 PROCEDURE — 93016 CV STRESS TEST SUPVJ ONLY: CPT | Performed by: INTERNAL MEDICINE

## 2022-04-15 PROCEDURE — 78452 HT MUSCLE IMAGE SPECT MULT: CPT | Performed by: INTERNAL MEDICINE

## 2022-04-15 PROCEDURE — A9502 TC99M TETROFOSMIN: HCPCS

## 2022-04-15 RX ADMIN — REGADENOSON 0.4 MG: 0.08 INJECTION, SOLUTION INTRAVENOUS at 13:44

## 2022-04-18 ENCOUNTER — TELEPHONE (OUTPATIENT)
Dept: CARDIOLOGY CLINIC | Facility: CLINIC | Age: 77
End: 2022-04-18

## 2022-04-19 LAB
CHEST PAIN STATEMENT: NORMAL
MAX DIASTOLIC BP: 84 MMHG
MAX HEART RATE: 104 BPM
MAX PREDICTED HEART RATE: 143 BPM
MAX. SYSTOLIC BP: 174 MMHG
PROTOCOL NAME: NORMAL
REASON FOR TERMINATION: NORMAL
TARGET HR FORMULA: NORMAL
TEST INDICATION: NORMAL
TIME IN EXERCISE PHASE: NORMAL

## 2022-04-27 ENCOUNTER — TELEPHONE (OUTPATIENT)
Dept: CARDIOLOGY CLINIC | Facility: CLINIC | Age: 77
End: 2022-04-27

## 2022-04-30 DIAGNOSIS — E11.9 TYPE 2 DIABETES MELLITUS WITHOUT COMPLICATION, WITHOUT LONG-TERM CURRENT USE OF INSULIN (HCC): ICD-10-CM

## 2022-05-02 RX ORDER — BLOOD SUGAR DIAGNOSTIC
STRIP MISCELLANEOUS
Qty: 100 STRIP | Refills: 0 | Status: SHIPPED | OUTPATIENT
Start: 2022-05-02

## 2022-06-01 ENCOUNTER — OFFICE VISIT (OUTPATIENT)
Dept: CARDIOLOGY CLINIC | Facility: CLINIC | Age: 77
End: 2022-06-01
Payer: MEDICARE

## 2022-06-01 VITALS
HEART RATE: 56 BPM | SYSTOLIC BLOOD PRESSURE: 146 MMHG | WEIGHT: 136.6 LBS | HEIGHT: 62 IN | BODY MASS INDEX: 25.14 KG/M2 | DIASTOLIC BLOOD PRESSURE: 72 MMHG

## 2022-06-01 DIAGNOSIS — R07.89 OTHER CHEST PAIN: Primary | ICD-10-CM

## 2022-06-01 DIAGNOSIS — I10 ESSENTIAL HYPERTENSION: ICD-10-CM

## 2022-06-01 PROCEDURE — 99212 OFFICE O/P EST SF 10 MIN: CPT | Performed by: INTERNAL MEDICINE

## 2022-06-01 NOTE — PROGRESS NOTES
[facility]    CHIEF COMPLAINT:   Chief Complaint   Patient presents with    Follow-up     Patient stated that sometimes when she lays down for bed she gets a little discomfort in her chest        HPI:  Nessa Klein is a 67 y o  female with a past medical history of hypertension, dyslipidemia, diabetes mellitus, former smoker who presents today for follow up of chest pain and shortness of breath  New symptoms over the past 6-9 months prior to our initial visit  Symptoms usually occur at night just before bed time  No particular stressors or anxiety  Describes this as central and L chest pressure  No associated symptoms  No radiation of discomfort  Occurs 2-3x per week  Moving position may actually help with the pain  Physical activity primarily limited due to R knee pain  Also complaints of shortness of breath which can occur on exertion  Some intermittent swelling of the left ankle at the end of the day  +fatigue    Interval history: Underwent pharm NM stress testing which was unremarkable  She reports feeling well overall  She does have some occasional chest discomfort when laying flat at night  She states that the more she thinks about it, she had similar symptoms many years ago which was triggered by a particular soup  She takes pantoprazole and feels that it helps with her symptoms  She hasn't tried to determine if there are any particular foods causing her current symptoms  She denies any lightheadedness, visual changes, shortness of breath at rest or with exertion, orthopnea, leg edema      The following portions of the patient's history were reviewed and updated as appropriate: allergies, current medications, past family history, past medical history, past social history, past surgical history, and problem list     SINCE LAST OV I REVIEWED WITH THE PATIENT THE INTERIM LABS, TEST RESULTS, CONSULTANT(S) NOTES AND PERFORMED AN INTERIM REVIEW OF HISTORY    Past Medical History: Diagnosis Date    Anxiety     Depression     Diabetes mellitus ([facility] )     Fibroid     3 2    Hypertension     Rheumatoid arthritis ([facility] )     rt knee, lt ankle       Past Surgical History:   Procedure Laterality Date    COLONOSCOPY  , 2017    DILATION AND CURETTAGE OF UTERUS      ORIF ANKLE FRACTURE Left     TONSILLECTOMY      TUBAL LIGATION      WISDOM TOOTH EXTRACTION         Social History     Socioeconomic History    Marital status:      Spouse name: Not on file    Number of children: Not on file    Years of education: Not on file    Highest education level: Not on file   Occupational History    Occupation: retired   Tobacco Use    Smoking status: Former Smoker     Packs/day: 0 50    Smokeless tobacco: Never Used    Tobacco comment: quit    Substance and Sexual Activity    Alcohol use: Yes     Comment: occasional    Drug use: Never    Sexual activity: Not on file   Other Topics Concern    Not on file   Social History Narrative    · Most recent tobacco use screenin2019      · Do you currently or have you served in [employer]:   No      · Occupation:   retired      ·· Caffeine intake:   None      ·· Marital status:          · Advance directive:   No      · Seat belts used routinely:   Yes      · Sexual orientation:   Heterosexual      · General stress level:   Medium      · Guns present in home:   No      Last modified by [id number]   2019, 03:30         · Date of LMP:       · Date of last pap smear:   2016 - neg      · Menses monthly:   No      · Age at first child:   none     · Age at menarche:   7      · Current birth control method:   Menopause      · If post menopausal, age at menopause:   47      · [place]:   15          Social Determinants of Health     Financial Resource Strain: Not on file   Food Insecurity: Not on file   Transportation Needs: Not on file   Physical Activity: Not on file   Stress: Not on file Social Connections: Not on file   Intimate Partner Violence: Not on file   Housing Stability: Not on file       Family History   Problem Relation Age of Onset    Alzheimer's disease Brother 80    Cancer Brother         malignant tumor pharynx       Allergies   Allergen Reactions    Shellfish Allergy - Food Allergy Anaphylaxis    Shellfish-Derived Products - Food Allergy     Other Rash     Other reaction(s): Unknown       Current Outpatient Medications   Medication Sig Dispense Refill    amLODIPine (NORVASC) 2 5 mg tablet TAKE 1 TABLET(2 5 MG) BY MOUTH DAILY 90 tablet 1    aspirin (ECOTRIN LOW STRENGTH) 81 mg EC tablet Take 1 capsule by mouth daily      atorvastatin (LIPITOR) 10 mg tablet TAKE 1 TABLET(10 MG) BY MOUTH DAILY 90 tablet 1    Diclofenac Sodium (VOLTAREN) 1 % Apply topically      glucose blood test strip Use 1 each daily One Touch Ultra Blue 100 each 3    Latanoprost 0 005 % EMUL INT 1 GTT IN OS QPM      losartan-hydrochlorothiazide (HYZAAR) 100-12 5 MG per tablet every 24 hours      Lumigan 0 01 % ophthalmic drops PLACE 1 DROP IN BOTH EYES DAILY AT BEDTIME      metFORMIN (GLUCOPHAGE-XR) 500 mg 24 hr tablet TAKE 1 TABLET(500 MG) BY MOUTH TWICE DAILY WITH MEALS 180 tablet 2    metoprolol succinate (KAPSPARGO SPRINKLE) 25 MG extended-release capsule every 24 hours      OneTouch Delica Lancets 94J MISC Test twice daily 100 each 5    atorvastatin (LIPITOR) 10 mg tablet TK 1 T PO QD      cetirizine (ZyrTEC) 10 mg tablet Take 10 mg by mouth daily (Patient not taking: Reported on 6/1/2022)      clobetasol (TEMOVATE) 0 05 % cream APPLY 1 APPLICATION TO THE RIGHT FOOT AS DIRECTED TWICE DAILY (Patient not taking: Reported on 6/1/2022)      erythromycin (ILOTYCIN) ophthalmic ointment APPLY EVERY DAY INTO RIGHT EYE AT BEDTIME (Patient not taking: Reported on 8/16/2021)      Ferrex 150 Forte 150-1-25 MG-MG-MCG CAPS Take 1 capsule by mouth daily 90 capsule 1    glucose blood test strip Use 1 each daily      losartan-hydrochlorothiazide (HYZAAR) 100-12 5 MG per tablet Take 1 tablet by mouth daily 90 tablet 2    metoprolol succinate (TOPROL-XL) 25 mg 24 hr tablet TAKE 1 TABLET(25 MG) BY MOUTH DAILY 90 tablet 1    mometasone (ELOCON) 0 1 % lotion Apply topically daily as needed (Ear Itch) 60 mL 0    MULTIPLE VITAMIN-FOLIC ACID PO every 24 hours      OneTouch Ultra test strip TEST ONCE DAILY 100 strip 0    pantoprazole (PROTONIX) 40 mg tablet Take 1 tablet (40 mg total) by mouth daily 90 tablet 5    potassium chloride (K-DUR,KLOR-CON) 10 mEq tablet TAKE 1 TABLET(10 MEQ) BY MOUTH DAILY 90 tablet 1    Propylene Glycol-Glycerin (Artificial Tears) 1-0 3 % SOLN 1 drop into affected eye as needed      timolol (BETIMOL) 0 25 % ophthalmic solution 1-2 drops 2 (two) times a day      timolol (TIMOPTIC) 0 5 % ophthalmic solution Administer 1 drop to both eyes daily      traMADol (ULTRAM) 50 mg tablet Take 50 mg by mouth daily (Patient not taking: Reported on 2/8/2022 )      traMADol (ULTRAM) 50 mg tablet 1 tablet as needed      triamcinolone (KENALOG) 0 1 % cream Apply topically 2 (two) times a day 30 g 0     No current facility-administered medications for this visit  /72 (BP Location: Left arm, Patient Position: Sitting, Cuff Size: Standard)   Pulse 56   Ht 5' 2" (1 575 m)   Wt 62 kg (136 lb 9 6 oz)   BMI 24 98 kg/m²     Review of Systems   All other systems reviewed and are negative  Physical Exam  Vitals reviewed  Constitutional:       General: She is not in acute distress  Appearance: She is well-developed and normal weight  She is not ill-appearing  HENT:      Head: Normocephalic and atraumatic  Eyes:      General: No scleral icterus  Neck:      Vascular: No JVD  Cardiovascular:      Rate and Rhythm: Normal rate and regular rhythm  Pulses: Normal pulses  Heart sounds: Normal heart sounds  No murmur heard  No gallop     Pulmonary:      Effort: Pulmonary effort is normal  No respiratory distress  Breath sounds: Normal breath sounds  No wheezing or rales  Abdominal:      General: Bowel sounds are normal  There is no distension  Palpations: Abdomen is soft  Tenderness: There is no abdominal tenderness  There is no guarding  Musculoskeletal:      Right lower leg: No edema  Left lower leg: No edema  Skin:     General: Skin is warm and dry  Coloration: Skin is not cyanotic or pale  Neurological:      Mental Status: She is alert  Lab Results   Component Value Date     02/10/2015    K 4 2 03/12/2021     03/12/2021    CO2 31 03/12/2021    BUN 21 (H) 03/12/2021    CREATININE 0 78 03/12/2021    GLUCOSE 107 02/10/2015    CALCIUM 10 2 03/12/2021    ALT 15 03/12/2021    AST 16 03/12/2021       Lab Results   Component Value Date    HDL 57 09/01/2020    LDLCALC 39 09/01/2020    TRIG 66 09/01/2020       Lab Results   Component Value Date    WBC 8 54 03/12/2021    HGB 10 6 (L) 03/12/2021    HCT 34 6 (L) 03/12/2021     03/12/2021       Lab Results   Component Value Date     03/12/2021    HGBA1C 7 5 02/02/2022     Cardiac studies:     Pharm NM stress - 4/15/22:    Resting ECG: The ECG shows sinus bradycardia  HR 59 BPM     Stress ECG: A pharmacological stress test was performed using regadenoson  The patient and had a maximal HR of 104 bpm ( % of MPHR) and  METS  The patient experienced no angina during the test  The patient reached the end of the protocol  Low level exercise was used during the pharmacological stress test     Stress ECG: The stress ECG is negative for ischemia after pharmacologic stress    Perfusion: There are no perfusion defects    Stress Function: Left ventricular function post-stress is normal  Post-stress ejection fraction is 65 %      Stress Combined Conclusion: The ECG and SPECT imaging portions of the stress study are concordant with no evidence of stress induced myocardial ischemia  ASSESSMENT AND PLAN:  Saba Freeman was seen today for follow-up  Diagnoses and all orders for this visit:    Other chest pain: Now describes a different discomfort which feels like a lump in the neck and radiates downwards to the epigastric area  She doesn't complain of any exertional symptoms although she isn't very active  NM stress did not reveal evidence of ischemia  Symptoms may very well be due to esophageal reflux/pathology  If her symptoms don't improve or worsen, I have recommend that is evaluated by GI for an endoscopy  Essential hypertension: Continue losartan-HCTZ 100-12 5 mg daily and metoprolol succinate 25 mg daily       Toni Barbosa MD

## 2022-06-01 NOTE — PATIENT INSTRUCTIONS
You were seen today in the Cardiology office for follow up  We reviewed the results of your nuclear stress test which were normal  Please follow up to have your colonoscopy performed and discuss the utility of an EGD  Your symptoms may be related to esophageal reflux  Thank you for choosing [facility] [address]  Please call our office or use Attributor with any questions

## 2022-06-21 DIAGNOSIS — E87.6 LOW BLOOD POTASSIUM: ICD-10-CM

## 2022-06-21 DIAGNOSIS — I10 ESSENTIAL HYPERTENSION: ICD-10-CM

## 2022-06-21 DIAGNOSIS — E78.5 DYSLIPIDEMIA: ICD-10-CM

## 2022-06-22 RX ORDER — POTASSIUM CHLORIDE 750 MG/1
TABLET, EXTENDED RELEASE ORAL
Qty: 90 TABLET | Refills: 0 | Status: SHIPPED | OUTPATIENT
Start: 2022-06-22

## 2022-06-22 RX ORDER — AMLODIPINE BESYLATE 2.5 MG/1
TABLET ORAL
Qty: 90 TABLET | Refills: 0 | Status: SHIPPED | OUTPATIENT
Start: 2022-06-22

## 2022-06-22 RX ORDER — ATORVASTATIN CALCIUM 10 MG/1
TABLET, FILM COATED ORAL
Qty: 90 TABLET | Refills: 0 | Status: SHIPPED | OUTPATIENT
Start: 2022-06-22 | End: 2022-08-03 | Stop reason: ALTCHOICE

## 2022-06-24 ENCOUNTER — TELEPHONE (OUTPATIENT)
Dept: GASTROENTEROLOGY | Facility: AMBULARY SURGERY CENTER | Age: 77
End: 2022-06-24

## 2022-06-24 NOTE — TELEPHONE ENCOUNTER
Patients GI provider:  Dr Chavarria Cost    Number to return call: [phone]     Reason for call: Pt calling to schedule repeat colonoscopy    Scheduled procedure/appointment date if applicable: Apt/procedure n/a

## 2022-06-29 ENCOUNTER — TELEPHONE (OUTPATIENT)
Dept: OBGYN CLINIC | Facility: CLINIC | Age: 77
End: 2022-06-29

## 2022-06-29 NOTE — TELEPHONE ENCOUNTER
Pt lmom - has not had an internal exam in several years and thought might be time, has been having some pelvic discomfort off and on

## 2022-06-29 NOTE — TELEPHONE ENCOUNTER
Pt reports intermittent pelvic discomfort off and on for about 2-3 months per patient  Pt denies bleeding or any other concerns at this time  Apt offered and scheduled

## 2022-07-07 ENCOUNTER — TELEPHONE (OUTPATIENT)
Dept: GASTROENTEROLOGY | Facility: CLINIC | Age: 77
End: 2022-07-07

## 2022-07-07 DIAGNOSIS — D64.9 CHRONIC ANEMIA: Primary | ICD-10-CM

## 2022-07-07 NOTE — TELEPHONE ENCOUNTER
Scheduled date of colonoscopy (as of today): 8/3/22  Physician performing colonoscopy: Dr Ramila Hardy  Location of colonoscopy: [facility]  Bowel prep reviewed with patient:miralax w/ mag & dul  Instructions reviewed with patient by:ls  Clearances: n/a

## 2022-07-07 NOTE — TELEPHONE ENCOUNTER
Could an order please be created for pt to have HGB checked? She informed has anemia and last HGB I am seeing is from 3/2021 when it was 10 6  Thank you so much!

## 2022-07-07 NOTE — TELEPHONE ENCOUNTER
Noted   Will check in a few days to see if pt has gone to have done as told her script would be in by tomorrow when scheduled

## 2022-07-07 NOTE — TELEPHONE ENCOUNTER
[facility] Assessment    Name: Melissa Meraz  YOB: 1945  Last Height: 5' 2" (1 575 m)  Last weight: 62 kg (136 lb 9 6 oz)  BMI: 24 98 kg/m²  Procedure: Colon  Diagnosis: hx of ta polyps  Date of procedure: 8/3/22  Prep: miralax w/ mag & dul  Responsible : yes  Phone#: [phone]  Name completing form: Jeanette Banks  Date form completed: 07/07/22      If the patient answers yes to any of these questions, schedule in a hospital  Are you pregnant: No  Do you rely on a wheelchair for mobility: No  Have you been diagnosed with End Stage Renal Disease (ESRD): No  Do you need oxygen during the day: No  Have you had a heart attack or stroke within the past three months: No  Have you had a seizure within the past three months: No  Have you ever been informed by anesthesia that you have a difficult airway: No  Additional Questions  Have you had any cardiac testing or are under the care of a Cardiologist (see cardiac list): No  Cardiac list:   Do you have an implanted cardiac defibrillator: No (Comment:  This patient should be scheduled in the hospital)    Have any bleeding problems, such as anemia or hemophilia (If patient has H&H result below 8, schedule in hospital   H&H must be within 30 days of procedure): Yes (Comment: Obtain H&H lab result)    Had an organ transplant within the past 3 months: No    Do you have any present infections: No  Do you get short of breath when walking a few blocks: Yes  Have you been diagnosed with diabetes: Yes  Comments (provide cardiac provider information if applicable): last hgb in 3/2021 was 10 6

## 2022-07-12 NOTE — TELEPHONE ENCOUNTER
Bw not done yet  I called pt to inform that script is in system to have her HGB checked and can have any time, does not need to fast   Will continue to watch for hgb results

## 2022-07-19 NOTE — TELEPHONE ENCOUNTER
BW not done yet for HGB  Will call pt in a few days to remind her to have and will watch for results

## 2022-07-22 NOTE — TELEPHONE ENCOUNTER
Spoke to pt whom informed she had bw done on Wed at [facility] [place] and that results are to be sent to us as well as pcp  If do not receive results by Monday, will call Dr [facility] office to see if received

## 2022-07-25 ENCOUNTER — TELEPHONE (OUTPATIENT)
Dept: GASTROENTEROLOGY | Facility: AMBULARY SURGERY CENTER | Age: 77
End: 2022-07-25

## 2022-07-25 NOTE — TELEPHONE ENCOUNTER
I called and spoke to patient. I went over prep instructions and answered all questions. Patient verbalized understanding. Thank you

## 2022-07-25 NOTE — TELEPHONE ENCOUNTER
Patients GI provider:  Dr. Higgins    Number to return call: (  [phone]    Reason for call: Pt calling with questions about her medication  That she needs to stop prior to colon     Scheduled procedure/appointment date if applicable: /procedure 8-2-33

## 2022-07-27 ENCOUNTER — TELEPHONE (OUTPATIENT)
Dept: GASTROENTEROLOGY | Facility: CLINIC | Age: 77
End: 2022-07-27

## 2022-07-27 NOTE — TELEPHONE ENCOUNTER
Spoke to confirming pt's colonoscopy scheduled on 8/3/22 with Dr Sherman Carbajal at [facility]  Informed pt that [facility] would be calling 1-2 days prior with arrival time  Informed pt of clear liquid diet the day before as well as the bowel cleansing preparation  Informed pt would need a  the day of the procedure due to being under sedation  Pt did not have any questions  Advised pt to contact insurance company if has any questions regarding coverage of procedure

## 2022-07-29 ENCOUNTER — NURSE TRIAGE (OUTPATIENT)
Dept: OTHER | Facility: OTHER | Age: 77
End: 2022-07-29

## 2022-07-29 NOTE — TELEPHONE ENCOUNTER
Patient also has questions regarding her lab results and is requesting Dr Jacinto Pickens when available  Reason for Disposition   Colonoscopy, questions about    Answer Assessment - Initial Assessment Questions  1  DATE/TIME: "When did you have your colonoscopy?"       Scheduled 8/3  2   MAIN CONCERN: "What is your main concern right now?" "What questions do you have?"      Diet prep      Prep was written miralax/mag/dulcolax     Patient had question regarding red sauce, red meat    Protocols used: COLONOSCOPY SYMPTOMS AND QUESTIONS-ADULT-SUDHA

## 2022-07-29 NOTE — TELEPHONE ENCOUNTER
Regarding: Colonoscopy diet instructions   ----- Message from Jorge Uribe RN sent at 7/29/2022 12:48 PM EDT -----  Patient is scheduled for a colonoscopy on 8/3 and has questions regarding dietary restrictions pre procedure

## 2022-08-03 ENCOUNTER — ANESTHESIA EVENT (OUTPATIENT)
Dept: GASTROENTEROLOGY | Facility: AMBULATORY SURGERY CENTER | Age: 77
End: 2022-08-03

## 2022-08-03 ENCOUNTER — HOSPITAL ENCOUNTER (OUTPATIENT)
Dept: GASTROENTEROLOGY | Facility: AMBULATORY SURGERY CENTER | Age: 77
Discharge: HOME | End: 2022-08-03
Payer: MEDICARE

## 2022-08-03 ENCOUNTER — ANESTHESIA (OUTPATIENT)
Dept: GASTROENTEROLOGY | Facility: AMBULATORY SURGERY CENTER | Age: 77
End: 2022-08-03

## 2022-08-03 VITALS
DIASTOLIC BLOOD PRESSURE: 76 MMHG | RESPIRATION RATE: 18 BRPM | HEIGHT: 62 IN | TEMPERATURE: 97 F | BODY MASS INDEX: 25.21 KG/M2 | SYSTOLIC BLOOD PRESSURE: 145 MMHG | WEIGHT: 137 LBS | OXYGEN SATURATION: 98 % | HEART RATE: 66 BPM

## 2022-08-03 DIAGNOSIS — Z86.010 HX OF ADENOMATOUS COLONIC POLYPS: ICD-10-CM

## 2022-08-03 PROCEDURE — G0105 COLORECTAL SCRN; HI RISK IND: HCPCS | Performed by: INTERNAL MEDICINE

## 2022-08-03 RX ORDER — SODIUM CHLORIDE, SODIUM LACTATE, POTASSIUM CHLORIDE, CALCIUM CHLORIDE 600; 310; 30; 20 MG/100ML; MG/100ML; MG/100ML; MG/100ML
INJECTION, SOLUTION INTRAVENOUS CONTINUOUS PRN
Status: DISCONTINUED | OUTPATIENT
Start: 2022-08-03 | End: 2022-08-03

## 2022-08-03 RX ORDER — SODIUM CHLORIDE 9 MG/ML
20 INJECTION, SOLUTION INTRAVENOUS CONTINUOUS
Status: DISCONTINUED | OUTPATIENT
Start: 2022-08-03 | End: 2022-08-07 | Stop reason: HOSPADM

## 2022-08-03 RX ORDER — POTASSIUM CHLORIDE 750 MG/1
10 CAPSULE, EXTENDED RELEASE ORAL DAILY
COMMUNITY

## 2022-08-03 RX ORDER — PROPOFOL 10 MG/ML
INJECTION, EMULSION INTRAVENOUS AS NEEDED
Status: DISCONTINUED | OUTPATIENT
Start: 2022-08-03 | End: 2022-08-03

## 2022-08-03 RX ADMIN — SODIUM CHLORIDE, SODIUM LACTATE, POTASSIUM CHLORIDE, CALCIUM CHLORIDE: 600; 310; 30; 20 INJECTION, SOLUTION INTRAVENOUS at 08:27

## 2022-08-03 RX ADMIN — PROPOFOL 50 MG: 10 INJECTION, EMULSION INTRAVENOUS at 08:56

## 2022-08-03 RX ADMIN — PROPOFOL 100 MG: 10 INJECTION, EMULSION INTRAVENOUS at 08:43

## 2022-08-03 RX ADMIN — PROPOFOL 50 MG: 10 INJECTION, EMULSION INTRAVENOUS at 08:48

## 2022-08-03 NOTE — INTERVAL H&P NOTE
H&P reviewed  After examining the patient I find no changes in the patients condition since the H&P had been written      Vitals:    08/03/22 0832   BP: 169/89   Pulse:    Resp:    Temp:    SpO2:

## 2022-08-03 NOTE — DISCHARGE SUMMARY
Discharge Summary - Wing Ahumada 65 y o  female MRN: [id number]    Unit/Bed#:  Encounter: [id number]    Admission Date:  08/03/2022    Admitting Diagnosis: Hx of adenomatous colonic polyps [[id number] [id number]]    HPI:  History of colon polyps    Procedures Performed: No orders of the defined types were placed in this encounter  Summary of Hospital Course: Tolerated procedure well    Significant Findings, Care, Treatment and Services Provided:  Diverticulosis and internal hemorrhoid noted  There were no recurrent polyp seen    Complications:  None    Discharge Diagnosis:  See above    Medical Problems             Resolved Problems  Date Reviewed: 6/1/2022   None                 Condition at Discharge: good         Discharge instructions/Information to patient and family:   See after visit summary for information provided to patient and family  Provisions for Follow-Up Care:  See after visit summary for information related to follow-up care and any pertinent home health orders        PCP: Marjorie Denise MD    Disposition: Home

## 2022-08-03 NOTE — H&P
History and Physical - [facility]  Melissa Meraz 65 y o  female MRN: [id number]                  HPI: Melissa Meraz is a 65y o  year old female who presents for colonoscopy  History of adenomatous colon polyp  Last colonoscopy six years ago  REVIEW OF SYSTEMS: Per the HPI, and otherwise unremarkable      Historical Information   Past Medical History:   Diagnosis Date    Anemia     Anxiety     Colon polyp     Depression     Diabetes mellitus (HCC)     Fibroid     3 2    GERD (gastroesophageal reflux disease)     Hyperlipidemia     Hypertension     Rheumatoid arthritis (HCC)     rt knee, lt ankle     Past Surgical History:   Procedure Laterality Date    BUNIONECTOMY Left     CATARACT EXTRACTION Right     COLONOSCOPY  2013, 2017    DILATION AND CURETTAGE OF UTERUS      ORIF ANKLE FRACTURE Left     STEROID INJECTION KNEE Right     TONSILLECTOMY      TUBAL LIGATION      WISDOM TOOTH EXTRACTION       Social History   Social History     Substance and Sexual Activity   Alcohol Use Yes    Comment: occasional     Social History     Substance and Sexual Activity   Drug Use Never     Social History     Tobacco Use   Smoking Status Former Smoker    Packs/day: 0 50   Smokeless Tobacco Never Used   Tobacco Comment    quit 1981     Family History   Problem Relation Age of Onset    Alzheimer's disease Brother 80    Cancer Brother         malignant tumor pharynx       Meds/Allergies       Current Outpatient Medications:     amLODIPine (NORVASC) 2 5 mg tablet    aspirin (ECOTRIN LOW STRENGTH) 81 mg EC tablet    atorvastatin (LIPITOR) 10 mg tablet    Diclofenac Sodium (VOLTAREN) 1 %    Ferrex 150 Forte 150-1-25 MG-MG-MCG CAPS    losartan-hydrochlorothiazide (HYZAAR) 100-12 5 MG per tablet    Lumigan 0 01 % ophthalmic drops    metFORMIN (GLUCOPHAGE-XR) 500 mg 24 hr tablet    metoprolol succinate (TOPROL-XL) 25 mg 24 hr tablet    Omega-3 Fatty Acids (FISH OIL PO)   potassium chloride (K-DUR,KLOR-CON) 10 mEq tablet    potassium chloride (MICRO-K) 10 MEQ CR capsule    Propylene Glycol-Glycerin (Artificial Tears) 1-0 3 % SOLN    timolol (TIMOPTIC) 0 5 % ophthalmic solution    glucose blood test strip    glucose blood test strip    losartan-hydrochlorothiazide (HYZAAR) 100-12 5 MG per tablet    mometasone (ELOCON) 0 1 % lotion    OneTouch Delica Lancets 74S MISC    OneTouch Ultra test strip    pantoprazole (PROTONIX) 40 mg tablet  No current facility-administered medications for this encounter  Facility-Administered Medications Ordered in Other Encounters:     lactated ringers infusion, , Intravenous, Continuous PRN, New Bag at 08/03/22 0827    Allergies   Allergen Reactions    Shellfish Allergy - Food Allergy Anaphylaxis    Shellfish-Derived Products - Food Allergy     Other Rash     Other reaction(s): bandaids       Objective     /89   Pulse 76   Temp (!) 97 °F (36 1 °C) (Temporal)   Resp 18   Ht 5' 2" (1 575 m)   Wt 62 1 kg (137 lb)   SpO2 97%   BMI 25 06 kg/m²       PHYSICAL EXAM    Gen: NAD  Head: NCAT  CV: RRR  CHEST: Clear  ABD: soft, NT/ND  EXT: no edema      ASSESSMENT/PLAN:  This is a 65y o  year old female here for colonoscopy, and she is stable and optimized for her procedure

## 2022-08-03 NOTE — DISCHARGE INSTRUCTIONS
Colonoscopy   WHAT YOU NEED TO KNOW:   A colonoscopy is a procedure to examine the inside of your colon (intestine) with a scope  Polyps or tissue growths may have been removed during your colonoscopy  It is normal to feel bloated and to have some abdominal discomfort  You should be passing gas  If you have hemorrhoids or you had polyps removed, you may have a small amount of bleeding  DISCHARGE INSTRUCTIONS:   Seek care immediately if:   You have sudden, severe abdominal pain  You have problems swallowing  You have a large amount of black, sticky bowel movements or blood in your bowel movements  You have sudden trouble breathing  You feel weak, lightheaded, or faint or your heart beats faster than normal for you  Contact your healthcare provider if:   You have a fever and chills  You have nausea or are vomiting  Your abdomen is bloated or feels full and hard  You have abdominal pain  You have black, sticky bowel movements or blood in your bowel movements  You have not had a bowel movement for 3 days after your procedure  You have rash or hives  You have questions or concerns about your procedure  Activity:   Do not lift, strain, or run for 24 hours after your procedure  Rest after your procedure  You have been given medicine to relax you  Do not drive or make important decisions until the day after your procedure  Return to your normal activity as directed  Relieve gas and discomfort from bloating by lying on your right side with a heating pad on your abdomen  You may need to take short walks to help the gas move out  Eat small meals until bloating is relieved  Follow up with your healthcare provider as directed: Write down your questions so you remember to ask them during your visits  If you take a blood thinner, please review the specific instructions from your endoscopist about when you should resume it   These can be found in the Recommendation and Your Medication list sections of this After Visit Summary  [id number]

## 2022-08-03 NOTE — ANESTHESIA POSTPROCEDURE EVALUATION
Post-Op Assessment Note    CV Status:  Stable  Pain Score: 0    Pain management: adequate     Mental Status:  Arousable and sleepy   Hydration Status:  Stable   PONV Controlled:  Controlled   Airway Patency:  Patent      Post Op Vitals Reviewed: Yes      Staff: CRNA         No complications documented      BP   111/62   temp  97 2   Pulse 52   Resp 14   SpO2 99

## 2022-08-03 NOTE — ANESTHESIA PREPROCEDURE EVALUATION
Procedure:  COLONOSCOPY    Relevant Problems   CARDIO   (+) Essential hypertension      ENDO   (+) Type 2 diabetes mellitus without complication, without long-term current use of insulin (HCC)      HEMATOLOGY   (+) Chronic anemia      Anxiety and Depression   Rheumatoid arthritis     Physical Exam    Airway    Mallampati score: II  TM Distance: >3 FB  Neck ROM: full     Dental   No notable dental hx     Cardiovascular      Pulmonary      Other Findings        Anesthesia Plan  ASA Score- 2     Anesthesia Type- IV sedation with anesthesia with ASA Monitors  Additional Monitors:   Airway Plan:           Plan Factors-    Chart reviewed  Patient summary reviewed  Patient is not a current smoker  Obstructive sleep apnea risk education given perioperatively  Induction- intravenous  Postoperative Plan-     Informed Consent- Anesthetic plan and risks discussed with patient  I personally reviewed this patient with the CRNA  Discussed and agreed on the Anesthesia Plan with the CRNA  Alfredo Conde

## 2022-08-15 ENCOUNTER — APPOINTMENT (OUTPATIENT)
Dept: LAB | Facility: HOSPITAL | Age: 77
End: 2022-08-15
Attending: INTERNAL MEDICINE
Payer: MEDICARE

## 2022-08-15 DIAGNOSIS — E11.65 TYPE II DIABETES MELLITUS WITH HYPEROSMOLARITY, UNCONTROLLED (HCC): ICD-10-CM

## 2022-08-15 DIAGNOSIS — E11.00 TYPE II DIABETES MELLITUS WITH HYPEROSMOLARITY, UNCONTROLLED (HCC): ICD-10-CM

## 2022-08-15 DIAGNOSIS — E78.49 FAMILIAL COMBINED HYPERLIPIDEMIA: ICD-10-CM

## 2022-08-15 DIAGNOSIS — D56.9 THALASSEMIA SYNDROME: ICD-10-CM

## 2022-08-15 DIAGNOSIS — E11.9 DIABETES MELLITUS WITHOUT COMPLICATION (HCC): ICD-10-CM

## 2022-08-15 LAB
ALBUMIN SERPL BCP-MCNC: 4.1 G/DL (ref 3.5–5)
ALP SERPL-CCNC: 82 U/L (ref 34–104)
ALT SERPL W P-5'-P-CCNC: 11 U/L (ref 7–52)
ANION GAP SERPL CALCULATED.3IONS-SCNC: 7 MMOL/L (ref 4–13)
AST SERPL W P-5'-P-CCNC: 13 U/L (ref 13–39)
BASOPHILS # BLD AUTO: 0.04 THOUSANDS/ΜL (ref 0–0.1)
BASOPHILS NFR BLD AUTO: 1 % (ref 0–1)
BILIRUB SERPL-MCNC: 0.36 MG/DL (ref 0.2–1)
BILIRUB UR QL STRIP: NEGATIVE
BUN SERPL-MCNC: 26 MG/DL (ref 5–25)
CALCIUM SERPL-MCNC: 9.5 MG/DL (ref 8.4–10.2)
CHLORIDE SERPL-SCNC: 102 MMOL/L (ref 96–108)
CHOLEST SERPL-MCNC: 111 MG/DL
CLARITY UR: CLEAR
CO2 SERPL-SCNC: 29 MMOL/L (ref 21–32)
COLOR UR: YELLOW
CREAT SERPL-MCNC: 0.74 MG/DL (ref 0.6–1.3)
EOSINOPHIL # BLD AUTO: 0.47 THOUSAND/ΜL (ref 0–0.61)
EOSINOPHIL NFR BLD AUTO: 6 % (ref 0–6)
ERYTHROCYTE [DISTWIDTH] IN BLOOD BY AUTOMATED COUNT: 16.7 % (ref 11.6–15.1)
GFR SERPL CREATININE-BSD FRML MDRD: 77 ML/MIN/1.73SQ M
GLUCOSE P FAST SERPL-MCNC: 116 MG/DL (ref 65–99)
GLUCOSE UR STRIP-MCNC: NEGATIVE MG/DL
HCT VFR BLD AUTO: 33.8 % (ref 34.8–46.1)
HDLC SERPL-MCNC: 58 MG/DL
HGB BLD-MCNC: 10.2 G/DL (ref 11.5–15.4)
HGB UR QL STRIP.AUTO: NEGATIVE
IMM GRANULOCYTES # BLD AUTO: 0.03 THOUSAND/UL (ref 0–0.2)
IMM GRANULOCYTES NFR BLD AUTO: 0 % (ref 0–2)
KETONES UR STRIP-MCNC: NEGATIVE MG/DL
LDLC SERPL CALC-MCNC: 43 MG/DL (ref 0–100)
LEUKOCYTE ESTERASE UR QL STRIP: NEGATIVE
LYMPHOCYTES # BLD AUTO: 2.81 THOUSANDS/ΜL (ref 0.6–4.47)
LYMPHOCYTES NFR BLD AUTO: 34 % (ref 14–44)
MCH RBC QN AUTO: 20.4 PG (ref 26.8–34.3)
MCHC RBC AUTO-ENTMCNC: 30.2 G/DL (ref 31.4–37.4)
MCV RBC AUTO: 68 FL (ref 82–98)
MONOCYTES # BLD AUTO: 0.66 THOUSAND/ΜL (ref 0.17–1.22)
MONOCYTES NFR BLD AUTO: 8 % (ref 4–12)
NEUTROPHILS # BLD AUTO: 4.22 THOUSANDS/ΜL (ref 1.85–7.62)
NEUTS SEG NFR BLD AUTO: 51 % (ref 43–75)
NITRITE UR QL STRIP: NEGATIVE
NONHDLC SERPL-MCNC: 53 MG/DL
NRBC BLD AUTO-RTO: 0 /100 WBCS
PH UR STRIP.AUTO: 5.5 [PH]
PLATELET # BLD AUTO: 328 THOUSANDS/UL (ref 149–390)
PMV BLD AUTO: 9.8 FL (ref 8.9–12.7)
POTASSIUM SERPL-SCNC: 4.2 MMOL/L (ref 3.5–5.3)
PROT SERPL-MCNC: 7.2 G/DL (ref 6.4–8.4)
PROT UR STRIP-MCNC: NEGATIVE MG/DL
RBC # BLD AUTO: 5.01 MILLION/UL (ref 3.81–5.12)
SODIUM SERPL-SCNC: 138 MMOL/L (ref 135–147)
SP GR UR STRIP.AUTO: >=1.03 (ref 1–1.03)
TRIGL SERPL-MCNC: 49 MG/DL
TSH SERPL DL<=0.05 MIU/L-ACNC: 1.48 UIU/ML (ref 0.45–4.5)
UROBILINOGEN UR QL STRIP.AUTO: 0.2 E.U./DL
WBC # BLD AUTO: 8.23 THOUSAND/UL (ref 4.31–10.16)

## 2022-08-15 PROCEDURE — 80053 COMPREHEN METABOLIC PANEL: CPT

## 2022-08-15 PROCEDURE — 85025 COMPLETE CBC W/AUTO DIFF WBC: CPT

## 2022-08-15 PROCEDURE — 80061 LIPID PANEL: CPT

## 2022-08-15 PROCEDURE — 36415 COLL VENOUS BLD VENIPUNCTURE: CPT

## 2022-08-15 PROCEDURE — 84443 ASSAY THYROID STIM HORMONE: CPT

## 2022-08-15 PROCEDURE — 83036 HEMOGLOBIN GLYCOSYLATED A1C: CPT

## 2022-08-16 LAB
EST. AVERAGE GLUCOSE BLD GHB EST-MCNC: 160 MG/DL
HBA1C MFR BLD: 7.2 %

## 2022-08-23 ENCOUNTER — ANNUAL EXAM (OUTPATIENT)
Dept: OBGYN CLINIC | Facility: CLINIC | Age: 77
End: 2022-08-23
Payer: MEDICARE

## 2022-08-23 VITALS
BODY MASS INDEX: 24.33 KG/M2 | SYSTOLIC BLOOD PRESSURE: 128 MMHG | HEIGHT: 62 IN | DIASTOLIC BLOOD PRESSURE: 78 MMHG | WEIGHT: 132.2 LBS

## 2022-08-23 DIAGNOSIS — Z01.411 ENCOUNTER FOR GYNECOLOGICAL EXAMINATION WITH ABNORMAL FINDING: Primary | ICD-10-CM

## 2022-08-23 DIAGNOSIS — R10.2 PELVIC PAIN: ICD-10-CM

## 2022-08-23 DIAGNOSIS — Z12.31 ENCOUNTER FOR SCREENING MAMMOGRAM FOR BREAST CANCER: ICD-10-CM

## 2022-08-23 PROCEDURE — G0101 CA SCREEN;PELVIC/BREAST EXAM: HCPCS | Performed by: PHYSICIAN ASSISTANT

## 2022-08-23 NOTE — PROGRESS NOTES
Assessment/Plan:    No problem-specific Assessment & Plan notes found for this encounter  Diagnoses and all orders for this visit:    Encounter for gynecological examination with abnormal finding    Pelvic pain  -     US pelvis complete w transvaginal; Future    Encounter for screening mammogram for breast cancer  -     Mammo screening bilateral w 3d & cad; Future        Pap not indicated  Order for mammogram for 2023 entered  Order for pelvic U/S to evaluate cramping entered  If U/S negative, most likely GI in origin  If no problems, patient to return in 1 year for routine gyn care  Subjective:      Patient ID: Hannah Mejia is a 65 y o  female  Patient is here for yearly gyn exam   Prior patient of Dr Brice's  States she is doing well overall  Notes some intermittent pelvic cramping over the last few months  Has not happened the last few weeks  States she has a history of fibroids  She is not sexually active  Denies bowel/bladder changes, vaginal bleeding, pelvic pain, bloating, abdominal pain, n/v, change in appetite, and thyroid disease  Up to date on her colonoscopy  DXA scan in April showed osteopenia; does not take calcium due to previous elevated levels  No history of abnormal Paps  Mammogram in April was negative  Patient is performing self-breast exam   Denies new masses, skin changes, nipple discharge, and pain/tenderness  The following portions of the patient's history were reviewed and updated as appropriate: allergies, current medications, past family history, past medical history, past social history, past surgical history and problem list     Review of Systems   Constitutional: Negative for appetite change and unexpected weight change  Cardiovascular:        No masses, skin changes, nipple discharge, and pain/tenderness  Gastrointestinal: Negative for abdominal distention, abdominal pain, constipation, diarrhea, nausea and vomiting     Genitourinary: Positive for pelvic pain  Negative for difficulty urinating, dysuria, frequency, genital sores, hematuria, menstrual problem, urgency, vaginal bleeding, vaginal discharge and vaginal pain  Objective:      /78 (BP Location: Left arm, Patient Position: Sitting)   Ht 5' 2" (1 575 m)   Wt 60 kg (132 lb 3 2 oz)   LMP  (LMP Unknown)   BMI 24 18 kg/m²          Physical Exam  Vitals reviewed  Exam conducted with a chaperone present  Constitutional:       Appearance: Normal appearance  She is well-developed and normal weight  Neck:      Thyroid: No thyromegaly  Pulmonary:      Effort: Pulmonary effort is normal    Chest:   Breasts: Breasts are symmetrical       Right: Normal  No swelling, bleeding, inverted nipple, mass, nipple discharge, skin change, tenderness, axillary adenopathy or supraclavicular adenopathy  Left: Normal  No swelling, bleeding, inverted nipple, mass, nipple discharge, skin change, tenderness, axillary adenopathy or supraclavicular adenopathy  Abdominal:      General: Abdomen is flat  There is no distension  Palpations: Abdomen is soft  Tenderness: There is no abdominal tenderness  Genitourinary:     General: Normal vulva  Pubic Area: No rash  Labia:         Right: No rash, tenderness, lesion or injury  Left: No rash, tenderness, lesion or injury  Vagina: Normal  No vaginal discharge, erythema, tenderness or bleeding  Cervix: Normal       Uterus: Normal        Adnexa: Right adnexa normal and left adnexa normal         Right: No mass, tenderness or fullness  Left: No mass, tenderness or fullness  Comments: Moderate vaginal atrophy present  Musculoskeletal:      Cervical back: Neck supple  Lymphadenopathy:      Cervical: No cervical adenopathy  Upper Body:      Right upper body: No supraclavicular or axillary adenopathy  Left upper body: No supraclavicular or axillary adenopathy        Lower Body: No right inguinal adenopathy  No left inguinal adenopathy  Skin:     General: Skin is warm and dry  Neurological:      Mental Status: She is alert and oriented to person, place, and time  Psychiatric:         Mood and Affect: Mood normal          Behavior: Behavior normal  Behavior is cooperative  Thought Content:  Thought content normal          Judgment: Judgment normal

## 2023-10-25 ENCOUNTER — NEW PATIENT COMPREHENSIVE (OUTPATIENT)
Dept: URBAN - METROPOLITAN AREA CLINIC 6 | Facility: CLINIC | Age: 78
End: 2023-10-25

## 2023-10-25 DIAGNOSIS — E11.9: ICD-10-CM

## 2023-10-25 DIAGNOSIS — H25.812: ICD-10-CM

## 2023-10-25 DIAGNOSIS — H40.1131: ICD-10-CM

## 2023-10-25 DIAGNOSIS — Z96.1: ICD-10-CM

## 2023-10-25 PROCEDURE — 99204 OFFICE O/P NEW MOD 45 MIN: CPT

## 2023-10-25 PROCEDURE — 92202 OPSCPY EXTND ON/MAC DRAW: CPT

## 2023-10-25 PROCEDURE — 92133 CPTRZD OPH DX IMG PST SGM ON: CPT

## 2023-10-25 PROCEDURE — 92020 GONIOSCOPY: CPT

## 2023-10-25 PROCEDURE — 76514 ECHO EXAM OF EYE THICKNESS: CPT

## 2023-10-25 ASSESSMENT — TONOMETRY
OD_IOP_MMHG: 14
OS_IOP_MMHG: 14
OS_IOP_MMHG: 14
OD_IOP_MMHG: 13

## 2023-10-25 ASSESSMENT — PACHYMETRY
OD_CT_UM: 527
OS_CT_UM: 533

## 2023-10-25 ASSESSMENT — VISUAL ACUITY: OD_SC: 20/50

## 2023-11-15 ENCOUNTER — PRE-OP CATARACT MEASUREMENTS (OUTPATIENT)
Dept: URBAN - METROPOLITAN AREA CLINIC 6 | Facility: CLINIC | Age: 78
End: 2023-11-15

## 2023-11-15 DIAGNOSIS — H40.1131: ICD-10-CM

## 2023-11-15 DIAGNOSIS — H25.812: ICD-10-CM

## 2023-11-15 PROCEDURE — 92136 OPHTHALMIC BIOMETRY: CPT

## 2023-11-15 PROCEDURE — 92012 INTRM OPH EXAM EST PATIENT: CPT

## 2023-11-15 ASSESSMENT — VISUAL ACUITY
OS_PH: 20/60
OS_SC: 20/400
OD_SC: 20/25

## 2023-11-15 ASSESSMENT — TONOMETRY
OD_IOP_MMHG: 9
OS_IOP_MMHG: 9

## 2023-12-13 ENCOUNTER — HOSPITAL ENCOUNTER (OUTPATIENT)
Dept: RADIOLOGY | Facility: HOSPITAL | Age: 78
Discharge: HOME | End: 2023-12-13
Attending: ORTHOPAEDIC SURGERY
Payer: MEDICARE

## 2023-12-13 ENCOUNTER — OFFICE VISIT (OUTPATIENT)
Dept: OBGYN CLINIC | Facility: CLINIC | Age: 78
End: 2023-12-13
Payer: MEDICARE

## 2023-12-13 VITALS — WEIGHT: 147.2 LBS | BODY MASS INDEX: 27.09 KG/M2 | HEIGHT: 62 IN

## 2023-12-13 DIAGNOSIS — M25.561 RIGHT KNEE PAIN, UNSPECIFIED CHRONICITY: ICD-10-CM

## 2023-12-13 DIAGNOSIS — E11.9 TYPE 2 DIABETES MELLITUS WITHOUT COMPLICATION, WITHOUT LONG-TERM CURRENT USE OF INSULIN (HCC): ICD-10-CM

## 2023-12-13 DIAGNOSIS — M17.11 PRIMARY OSTEOARTHRITIS OF RIGHT KNEE: Primary | ICD-10-CM

## 2023-12-13 DIAGNOSIS — Z01.818 PREOPERATIVE TESTING: ICD-10-CM

## 2023-12-13 PROBLEM — M35.01 SICCA SYNDROME WITH KERATOCONJUNCTIVITIS (HCC): Status: ACTIVE | Noted: 2023-12-13

## 2023-12-13 PROBLEM — H16.229 SICCA SYNDROME WITH KERATOCONJUNCTIVITIS: Status: ACTIVE | Noted: 2023-12-13

## 2023-12-13 PROCEDURE — 99204 OFFICE O/P NEW MOD 45 MIN: CPT | Performed by: ORTHOPAEDIC SURGERY

## 2023-12-13 PROCEDURE — 73562 X-RAY EXAM OF KNEE 3: CPT

## 2023-12-13 RX ORDER — ACETAMINOPHEN 325 MG/1
975 TABLET ORAL ONCE
OUTPATIENT
Start: 2023-12-13 | End: 2023-12-13

## 2023-12-13 RX ORDER — PHENOL 1.4 %
600 AEROSOL, SPRAY (ML) MUCOUS MEMBRANE 2 TIMES DAILY WITH MEALS
COMMUNITY

## 2023-12-13 RX ORDER — MULTIVIT-MIN/IRON FUM/FOLIC AC 7.5 MG-4
1 TABLET ORAL DAILY
Qty: 30 TABLET | Refills: 0 | Status: SHIPPED | OUTPATIENT
Start: 2023-12-13

## 2023-12-13 RX ORDER — FERROUS SULFATE 324(65)MG
324 TABLET, DELAYED RELEASE (ENTERIC COATED) ORAL
Qty: 60 TABLET | Refills: 1 | Status: SHIPPED | OUTPATIENT
Start: 2023-12-13

## 2023-12-13 RX ORDER — FOLIC ACID 1 MG/1
1 TABLET ORAL DAILY
Qty: 30 TABLET | Refills: 1 | Status: SHIPPED | OUTPATIENT
Start: 2023-12-13

## 2023-12-13 RX ORDER — SODIUM CHLORIDE, SODIUM LACTATE, POTASSIUM CHLORIDE, CALCIUM CHLORIDE 600; 310; 30; 20 MG/100ML; MG/100ML; MG/100ML; MG/100ML
125 INJECTION, SOLUTION INTRAVENOUS CONTINUOUS
OUTPATIENT
Start: 2023-12-13

## 2023-12-13 RX ORDER — CHLORHEXIDINE GLUCONATE 4 G/100ML
SOLUTION TOPICAL DAILY PRN
OUTPATIENT
Start: 2023-12-13

## 2023-12-13 RX ORDER — CHLORHEXIDINE GLUCONATE ORAL RINSE 1.2 MG/ML
15 SOLUTION DENTAL ONCE
OUTPATIENT
Start: 2023-12-13 | End: 2023-12-13

## 2023-12-13 RX ORDER — DIPHENOXYLATE HYDROCHLORIDE AND ATROPINE SULFATE 2.5; .025 MG/1; MG/1
1 TABLET ORAL DAILY
COMMUNITY

## 2023-12-13 RX ORDER — ASCORBIC ACID 500 MG
500 TABLET ORAL 2 TIMES DAILY
Qty: 60 TABLET | Refills: 1 | Status: SHIPPED | OUTPATIENT
Start: 2023-12-13

## 2023-12-13 NOTE — PROGRESS NOTES
Assessment:  1. Primary osteoarthritis of right knee        2. Right knee pain, unspecified chronicity  XR knee 3 vw right non injury      3. Preoperative testing            Plan:    Patient has chronic right knee pain due to severe osteoarthritis  Weight-bearing  as tolerated  Discussed with patient surgery for right total knee arthroplasty and we will proceed forward schedule for surgery  The benefits and purpose of the operations and/or procedure have been explained to me in language I understand by Dr. Armando Pruitt well as the risks and benefits, alternatives and complications pertaining to the above procedure/surgery which include but is not limited to: infections, deeps vein thrombosis, blood clots to the lungs, wound healing problems, complications from extensive blood loss, including shock, possible death, continued pain, fracture, vascular or nerve injury, potential need for further surgery/revision, persistent pain/stiffness/instability, failure of hardware,heart attack, stroke and not obtaining results patient used. Same-day surgery  Patient will be on aspirin 81 mg twice daily for DVT prophylaxis  Patient will need clearance by her PCP        The above stated was discussed in layman's terms and the patient expressed understanding. All questions were answered to the patient's satisfaction. Subjective:   Pernell Hawkins is a 77 y.o. female who presents today consultation for right knee pain. Patient was previously treating at [facility] with  and was receiving right knee Euflexxa injections,last injection on 8/29/23 with some relief  and steroid injections with some relief. States she is having pain mostly in the medial aspect of the right knee. Her pain is worse with weightbearing stairs and increased activities. She does feel her knee is unstable when walking. Patient has been using Voltaren gel and taking Tylenol for pain relief.   She is currently physical therapy and has been working on strengthening exercises for the right knee.       Review of systems negative unless otherwise specified in HPI    Past Medical History:   Diagnosis Date    Anemia     Anxiety     Colon polyp     Depression     Diabetes mellitus (HCC)     Fibroid     3.2    GERD (gastroesophageal reflux disease)     Hyperlipidemia     Hypertension     Rheumatoid arthritis ([address])     rt knee, lt ankle       Past Surgical History:   Procedure Laterality Date    BUNIONECTOMY Left     CATARACT EXTRACTION Right     COLONOSCOPY  2013, 2017    DILATION AND CURETTAGE OF UTERUS      ORIF ANKLE FRACTURE Left     STEROID INJECTION KNEE Right     TONSILLECTOMY      TUBAL LIGATION      WISDOM TOOTH EXTRACTION         Family History   Problem Relation Age of Onset    Hypertension Mother     Diabetes Mother     Glaucoma Mother     No Known Problems Father     Alzheimer's disease Brother 80    Cancer Brother         malignant tumor pharynx    Colon cancer Other        Social History     Occupational History    Occupation: retired   Tobacco Use    Smoking status: Former     Current packs/day: 0.50     Types: Cigarettes    Smokeless tobacco: Never    Tobacco comments:     quit 1981   Vaping Use    Vaping status: Never Used   Substance and Sexual Activity    Alcohol use: Yes     Comment: occasional    Drug use: Never    Sexual activity: Not Currently         Current Outpatient Medications:     amLODIPine (NORVASC) 2.5 mg tablet, TAKE 1 TABLET(2.5 MG) BY MOUTH DAILY, Disp: 90 tablet, Rfl: 0    aspirin (ECOTRIN LOW STRENGTH) 81 mg EC tablet, Take 1 capsule by mouth daily, Disp: , Rfl:     atorvastatin (LIPITOR) 10 mg tablet, TK 1 T PO QD, Disp: , Rfl:     calcium carbonate (OS-MARTÍN) 600 MG tablet, Take 600 mg by mouth 2 (two) times a day with meals, Disp: , Rfl:     losartan-hydrochlorothiazide (HYZAAR) 100-12.5 MG per tablet, every 24 hours, Disp: , Rfl:     Lumigan 0.01 % ophthalmic drops, PLACE 1 DROP IN BOTH EYES DAILY AT BEDTIME, Disp: , Rfl:     metFORMIN (GLUCOPHAGE-XR) 500 mg 24 hr tablet, TAKE 1 TABLET(500 MG) BY MOUTH TWICE DAILY WITH MEALS, Disp: 180 tablet, Rfl: 2    metoprolol succinate (TOPROL-XL) 25 mg 24 hr tablet, TAKE 1 TABLET(25 MG) BY MOUTH DAILY, Disp: 90 tablet, Rfl: 1    multivitamin (THERAGRAN) TABS, Take 1 tablet by mouth daily, Disp: , Rfl:     Omega-3 Fatty Acids (FISH OIL PO), Take by mouth in the morning, Disp: , Rfl:     potassium chloride (K-DUR,KLOR-CON) 10 mEq tablet, TAKE 1 TABLET(10 MEQ) BY MOUTH DAILY, Disp: 90 tablet, Rfl: 0    timolol (TIMOPTIC) 0.5 % ophthalmic solution, Administer 1 drop to both eyes daily, Disp: , Rfl:     Diclofenac Sodium (VOLTAREN) 1 %, Apply topically (Patient not taking: Reported on 12/13/2023), Disp: , Rfl:     Ferrex 150 Forte 150-1-25 MG-MG-MCG CAPS, Take 1 capsule by mouth daily (Patient not taking: Reported on 8/23/2022), Disp: 90 capsule, Rfl: 1    glucose blood test strip, Use 1 each daily (Patient not taking: Reported on 10/12/2023), Disp: , Rfl:     glucose blood test strip, Use 1 each daily One Touch Ultra Blue (Patient not taking: Reported on 10/12/2023), Disp: 100 each, Rfl: 3    mometasone (ELOCON) 0.1 % lotion, Apply topically daily as needed (Ear Itch) (Patient not taking: Reported on 6/1/2022), Disp: 60 mL, Rfl: 0    OneTouch Delica Lancets 65W MISC, Test twice daily (Patient not taking: Reported on 10/12/2023), Disp: 100 each, Rfl: 5    OneTouch Ultra test strip, TEST ONCE DAILY (Patient not taking: Reported on 10/12/2023), Disp: 100 strip, Rfl: 0    pantoprazole (PROTONIX) 40 mg tablet, Take 1 tablet (40 mg total) by mouth daily, Disp: 90 tablet, Rfl: 5    potassium chloride (MICRO-K) 10 MEQ CR capsule, Take 10 mEq by mouth daily (Patient not taking: Reported on 10/12/2023), Disp: , Rfl:     Propylene Glycol-Glycerin (Artificial Tears) 1-0.3 % SOLN, 1 drop into affected eye as needed (Patient not taking: Reported on 12/13/2023), Disp: , Rfl:     traMADol (ULTRAM) 50 mg tablet, , Disp: , Rfl:     Allergies   Allergen Reactions    Shellfish Allergy - Food Allergy Anaphylaxis    Iodine - Food Allergy Other (See Comments)     Other Reaction(s): Not available, Unknown    Medical Tape Other (See Comments)    Shellfish-Derived Products - Food Allergy     Other Rash     Other reaction(s): bandaids          There were no vitals filed for this visit. Body mass index is 26.92 kg/m². Wt Readings from Last 3 Encounters:   12/13/23 66.8 kg (147 lb 3.2 oz)   10/12/23 64.4 kg (142 lb)   08/23/22 60 kg (132 lb 3.2 oz)       Objective:            Physical Exam  Physical Exam:      General Appearance:    Alert, cooperative, no distress, appears stated age   Head:    Normocephalic, without obvious abnormality, atraumatic   Eyes:    conjunctiva/corneas clear, both eyes         Nose:   Nares normal, septum midline, no drainage    Throat:   Lips normal; teeth and gums normal   Neck:    symmetrical, trachea midline, ;     thyroid:  no enlargement/   Back:     Symmetric, no curvature, ROM normal   Lungs:   No audible wheezing or labored breathing   Chest Wall:    No tenderness or deformity    Heart:    Regular rate and rhythm                         Pulses:   2+ and symmetric all extremities   Skin:   Skin color, texture, turgor normal, no rashes or lesions   Neurologic:   normal strength, sensation and reflexes     throughout                       Ortho Exam  Right  Knee  Skin intact  Varus alignment   No erythema or open wounds  Mild effusion  Tenderness palpation of medial joint line  No lateral joint line tenderness  10-15 flexion contracture   [id number]  Patellar grind test Negative/ with crepitus   Stable to varus and valgus stress  Negative posterior drawer  Negative Lachman test  Neurovascular intact distally     Diagnostics, reviewed and taken today if performed as documented:     The attending physician has personally reviewed the pertinent films in PACS and interpretation is as follows:XR right knee demonstrates tricompartmental joint space narrowing worse in the medial compartment with osteophyte formation      Procedures, if performed today:    Procedures    None performed      Scribe Attestation      I,:  Caitlin Iyer am acting as a scribe while in the presence of the attending physician.:       I,:  Abhijeet Perez MD personally performed the services described in this documentation    as scribed in my presence.:               Portions of the record may have been created with voice recognition software. Occasional wrong word or "sound a like" substitutions may have occurred due to the inherent limitations of voice recognition software. Read the chart carefully and recognize, using context, where substitutions have occurred.

## 2023-12-14 ENCOUNTER — SURGERY/PROCEDURE (OUTPATIENT)
Dept: URBAN - METROPOLITAN AREA SURGICAL CENTER 6 | Facility: SURGICAL CENTER | Age: 78
End: 2023-12-14

## 2023-12-14 DIAGNOSIS — H25.812: ICD-10-CM

## 2023-12-14 DIAGNOSIS — H40.1121: ICD-10-CM

## 2023-12-14 PROCEDURE — 66174 TRLUML DIL AQ O/F CAN W/O ST: CPT | Mod: 54,LT

## 2023-12-14 PROCEDURE — 66991 XCAPSL CTRC RMVL INSJ 1+: CPT | Mod: 54,LT

## 2023-12-14 PROCEDURE — 68841 INSJ RX ELUT IMPLT LAC CANAL: CPT

## 2023-12-15 ENCOUNTER — 1 DAY POST-OP (OUTPATIENT)
Dept: URBAN - METROPOLITAN AREA CLINIC 6 | Facility: CLINIC | Age: 78
End: 2023-12-15

## 2023-12-15 DIAGNOSIS — H25.812: ICD-10-CM

## 2023-12-15 DIAGNOSIS — Z96.1: ICD-10-CM

## 2023-12-15 DIAGNOSIS — H40.1131: ICD-10-CM

## 2023-12-15 PROCEDURE — 99024 POSTOP FOLLOW-UP VISIT: CPT

## 2023-12-15 ASSESSMENT — TONOMETRY
OS_IOP_MMHG: 10
OD_IOP_MMHG: 11

## 2023-12-15 ASSESSMENT — VISUAL ACUITY
OS_PH: 20/40
OS_SC: 20/70-1
OS_SC: 20/30-1

## 2023-12-18 ENCOUNTER — TELEPHONE (OUTPATIENT)
Dept: OBGYN CLINIC | Facility: CLINIC | Age: 78
End: 2023-12-18

## 2023-12-18 NOTE — TELEPHONE ENCOUNTER
Returned pt's call per message received.  Pt has questions about her anemia and if she needs any treatment for it pre-op.  Stated that I would send that question to the team for direction and call her back with their recommendations.  Pt verbalized understanding.

## 2023-12-28 ENCOUNTER — TELEPHONE (OUTPATIENT)
Dept: OBGYN CLINIC | Facility: CLINIC | Age: 78
End: 2023-12-28

## 2023-12-28 NOTE — TELEPHONE ENCOUNTER
Pt called asking if she should begin taking her vitamins/iron before the recommended start date due to a typically lower hemoglobin.  Message sent to provide to advise.  Told pt I will call her back once I have an answer from the provider.  Pt verbalized understanding.

## 2024-01-02 ENCOUNTER — TELEPHONE (OUTPATIENT)
Dept: OBGYN CLINIC | Facility: CLINIC | Age: 79
End: 2024-01-02

## 2024-01-02 NOTE — TELEPHONE ENCOUNTER
Informed pt of provider's recommendation to begin her pre-op vitamins now to work on her anemia.  Pt to call when refills are needed.  Pt agreeable to this plan.

## 2024-01-30 ENCOUNTER — TELEPHONE (OUTPATIENT)
Dept: OBGYN CLINIC | Facility: HOSPITAL | Age: 79
End: 2024-01-30

## 2024-01-30 DIAGNOSIS — M17.11 PRIMARY OSTEOARTHRITIS OF RIGHT KNEE: Primary | ICD-10-CM

## 2024-01-30 NOTE — TELEPHONE ENCOUNTER
Preoperative Elective Admission Assessment- spoke with patient     Living Situation:    Who does pt live with: lives alone  What kind of home: multi-level  How do they enter the home: front  How many levels in home: 2  # of steps to enter home: 7  # of steps to second floor: 7  Are there handrails: Yes  Are there landings: Yes  Sleeping arrangement: second floor  Where is Bathroom: second floor   Where is the tub or shower: tub shower   Dogs or ther pets: 2     First Floor Setup:   Is there a bathroom: No  Where would pt sleep: bed     DME: ordering RW & RTS. Advised to bring to RW DOS PROVIDED ADAPT [facility] NUMBER TO CONTACT: [phone]   We discussed clearing pathways in the home and making sure there is accessibly to use the walker, for example, removing throw rugs.      Patient's Current Level of Function: Ambulates with cane    Post-op Caregiver:  Does not have- will refer to viky   Caregiver Name and phone number for Inpatient discharge needs: Eusebio Solis [phone]  Currently receive any HHC/aides/community supports: No?     Post-op Transport: friend  To/from hospital: friend  To/from PT 2-3x/week: does not have transportation. Needs in home PT   Uses community transport now: No     Outpatient Physical Therapy Site:  Site: N/A  pre and post-op appts scheduled? Yes     Medication Management: self  Preferred Pharmacy for Post-op Medications: [id number] - [place] - [address] [[id number]]   Blood Management Vitamin Regimen: will be taking 30 days pre operatively   Post-op anticoagulant: to be determined by surgical team postoperatively     DC Plan: Pt plans to be discharged home      Barriers to DC identified preoperatively: caregiver support. Transportation     BMI: 26.92        Patient Education:  Pt educated on post-op pain, early mobilization (POD0), LOS goals, OP PT goals, and preoperative bathing. Patient educated that our goal is to appropriately discharge patient based off their post-op  function while striving to maintain maximal independence. The goal is to discharge patient to home and for them to attend outpatient physical therapy.    Assigned to care team? Yes      SW referral: YES

## 2024-01-31 ENCOUNTER — PATIENT OUTREACH (OUTPATIENT)
Dept: OBGYN CLINIC | Facility: HOSPITAL | Age: 79
End: 2024-01-31

## 2024-01-31 DIAGNOSIS — Z74.8 ASSISTANCE NEEDED WITH TRANSPORTATION: Primary | ICD-10-CM

## 2024-01-31 DIAGNOSIS — Z01.89 ENCOUNTER FOR GERIATRIC ASSESSMENT: Primary | ICD-10-CM

## 2024-01-31 DIAGNOSIS — M17.11 PRIMARY OSTEOARTHRITIS OF RIGHT KNEE: ICD-10-CM

## 2024-01-31 RX ORDER — FERROUS SULFATE 324(65)MG
324 TABLET, DELAYED RELEASE (ENTERIC COATED) ORAL
Qty: 60 TABLET | Refills: 1 | Status: SHIPPED | OUTPATIENT
Start: 2024-01-31

## 2024-01-31 RX ORDER — ASCORBIC ACID 500 MG
500 TABLET ORAL 2 TIMES DAILY
Qty: 60 TABLET | Refills: 1 | Status: SHIPPED | OUTPATIENT
Start: 2024-01-31

## 2024-01-31 RX ORDER — FOLIC ACID 1 MG/1
1 TABLET ORAL DAILY
Qty: 30 TABLET | Refills: 1 | Status: SHIPPED | OUTPATIENT
Start: 2024-01-31

## 2024-01-31 NOTE — PROGRESS NOTES
RAFAEL received a new referral on 01/31/2024 in regard to pt scheduled for arthroplasty of right knee on 03/06/2024. SWCM reviewed NN notes prior to contacting pt. Pt lives alone in a multi level home. Pt ambulates with a cane. Pt has no caregiver support planned. Pt has a friend that will take her to and from surgery. Pt has no transportation to and from OP PT, pt requested in home PT. Pt plans to be DC to home. REIDCM contacted pt today and introduced self and role. SWCM and pt first discussed caregiver support. Pt resides alone but has a gentlemen friend that comes over in the evenings. He will be the person taking pt to and from surgery. Pt also has a niece that may  be able to help her when she is not working. Pt shared today she had not put much thought into DC planning and she will start trying to plan. SWCM and pt also discussed self pay caregiver support. Pt is unsure if this is an affordable option for her. She did take the number for [employer] to inquire about cost/ services @ [phone]. Pt is independent at baseline with ADLs and would not need [facility] on Aging referral. Pt will speak with her friend and niece and follow up with [facility] agency. SWCM and pt then discussed transportation. Pt has a ride to and from surgery but not to OP PT. SWCM and pt discussed [employer] and pt is interested in applying with help. SWCM will refer pt to [facility] to help with same. REIDCM also inquired if pt is interested in Meals [employer] Wheels and pt is. SWCM will make referral today. SWCM and pt will touch base next week.     After call SWCM made referral via [employer].

## 2024-02-05 ENCOUNTER — TELEPHONE (OUTPATIENT)
Dept: OBGYN CLINIC | Facility: CLINIC | Age: 79
End: 2024-02-05

## 2024-02-05 NOTE — TELEPHONE ENCOUNTER
Returned pt's call per message received.  Pt asking if she needs to be fasting for blood work.  Told pt that for our purposes, she does not need to be fasting.  Provided my direct call back number for any further questions.

## 2024-02-05 NOTE — TELEPHONE ENCOUNTER
Pt returned my call, all questions regarding lab orders were answered to pt's satisfaction.  Rx request for more multi-vitamins was sent to provider along with a question about the folic acid.  Pt verbalized understanding.

## 2024-02-06 ENCOUNTER — PATIENT OUTREACH (OUTPATIENT)
Dept: OBGYN CLINIC | Facility: HOSPITAL | Age: 79
End: 2024-02-06

## 2024-02-06 DIAGNOSIS — M17.11 PRIMARY OSTEOARTHRITIS OF RIGHT KNEE: ICD-10-CM

## 2024-02-06 RX ORDER — MULTIVIT-MIN/IRON FUM/FOLIC AC 7.5 MG-4
1 TABLET ORAL DAILY
Qty: 30 TABLET | Refills: 0 | Status: SHIPPED | OUTPATIENT
Start: 2024-02-06

## 2024-02-06 NOTE — PROGRESS NOTES
SWCM contacted pt to day for follow up in regard to caregiver support. Pt has not contacted them yet and plans to contact them by the end of the week. Pt has not yet heard from [employer] or [facility], SWCM will contact pt next week for follow up. SWCM will remain available and continue to support pt.

## 2024-02-07 ENCOUNTER — PATIENT OUTREACH (OUTPATIENT)
Dept: OBGYN CLINIC | Facility: HOSPITAL | Age: 79
End: 2024-02-07

## 2024-02-07 NOTE — PROGRESS NOTES
Chart Review and Outgoing Call:  02/07/2024    CMOC received patient referral from [facility] REID Hernandez.    CMOC completed a chart review. Per chart review, patient is having an arthroplasty of right knee on 03/06/2024. Patient has no transportation for OP PT. Patient does have a ride to and from hospital the day of surgery. OPCM and patient discussed [employer], and patient is interested in applying.    CMOC called patient, introduced myself and explained my role. We discussed the referral that was received and patient accepts [facility]'s services.     Patient was not able to complete [employer] application at this time. Patient asked CMOC to call back.    CMOC will call patient back.    Addendum:  CMOC called patient back as requested.    Patient used [employer] prior for medical trips. Patient knows how to use service. CMOC and patient were able complete a Senior [employer] application.  CMOC recieved an incoming email from [employer] to confirm submission.  Email:  Application ID [id number] has been submitted for Jada Lanier.     Patient told CMOC that she has an appointment on 02/15/2024. CMOC said I can call MONICA to see if the application was processed in a week. However, patient is aware that approval may take two weeks.    CMOC will call MONICA in about one week to check the status of patient's application.    Next scheduled outreach is 02/14/2024.

## 2024-02-08 ENCOUNTER — TELEPHONE (OUTPATIENT)
Dept: OBGYN CLINIC | Facility: CLINIC | Age: 79
End: 2024-02-08

## 2024-02-08 ENCOUNTER — APPOINTMENT (OUTPATIENT)
Dept: LAB | Facility: CLINIC | Age: 79
End: 2024-02-08
Payer: MEDICARE

## 2024-02-08 DIAGNOSIS — M17.11 PRIMARY OSTEOARTHRITIS OF RIGHT KNEE: ICD-10-CM

## 2024-02-08 LAB
ALBUMIN SERPL BCP-MCNC: 4.3 G/DL (ref 3.5–5)
ALP SERPL-CCNC: 66 U/L (ref 34–104)
ALT SERPL W P-5'-P-CCNC: 11 U/L (ref 7–52)
ANION GAP SERPL CALCULATED.3IONS-SCNC: 5 MMOL/L
APTT PPP: 29 SECONDS (ref 23–37)
AST SERPL W P-5'-P-CCNC: 18 U/L (ref 13–39)
ATRIAL RATE: 59 BPM
BASOPHILS # BLD AUTO: 0.06 THOUSANDS/ÂΜL (ref 0–0.1)
BASOPHILS NFR BLD AUTO: 1 % (ref 0–1)
BILIRUB SERPL-MCNC: 0.44 MG/DL (ref 0.2–1)
BUN SERPL-MCNC: 19 MG/DL (ref 5–25)
CALCIUM SERPL-MCNC: 10.2 MG/DL (ref 8.4–10.2)
CHLORIDE SERPL-SCNC: 103 MMOL/L (ref 96–108)
CO2 SERPL-SCNC: 31 MMOL/L (ref 21–32)
CREAT SERPL-MCNC: 0.86 MG/DL (ref 0.6–1.3)
EOSINOPHIL # BLD AUTO: 0.4 THOUSAND/ÂΜL (ref 0–0.61)
EOSINOPHIL NFR BLD AUTO: 4 % (ref 0–6)
ERYTHROCYTE [DISTWIDTH] IN BLOOD BY AUTOMATED COUNT: 16.7 % (ref 11.6–15.1)
EST. AVERAGE GLUCOSE BLD GHB EST-MCNC: 166 MG/DL
GFR SERPL CREATININE-BSD FRML MDRD: 64 ML/MIN/1.73SQ M
GLUCOSE SERPL-MCNC: 150 MG/DL (ref 65–140)
HBA1C MFR BLD: 7.4 %
HCT VFR BLD AUTO: 34.4 % (ref 34.8–46.1)
HGB BLD-MCNC: 10.1 G/DL (ref 11.5–15.4)
IMM GRANULOCYTES # BLD AUTO: 0.03 THOUSAND/UL (ref 0–0.2)
IMM GRANULOCYTES NFR BLD AUTO: 0 % (ref 0–2)
INR PPP: 1 (ref 0.84–1.19)
LYMPHOCYTES # BLD AUTO: 2.85 THOUSANDS/ÂΜL (ref 0.6–4.47)
LYMPHOCYTES NFR BLD AUTO: 30 % (ref 14–44)
MCH RBC QN AUTO: 19.8 PG (ref 26.8–34.3)
MCHC RBC AUTO-ENTMCNC: 29.4 G/DL (ref 31.4–37.4)
MCV RBC AUTO: 68 FL (ref 82–98)
MONOCYTES # BLD AUTO: 0.69 THOUSAND/ÂΜL (ref 0.17–1.22)
MONOCYTES NFR BLD AUTO: 7 % (ref 4–12)
NEUTROPHILS # BLD AUTO: 5.39 THOUSANDS/ÂΜL (ref 1.85–7.62)
NEUTS SEG NFR BLD AUTO: 58 % (ref 43–75)
NRBC BLD AUTO-RTO: 0 /100 WBCS
P AXIS: 62 DEGREES
PLATELET # BLD AUTO: 351 THOUSANDS/UL (ref 149–390)
PMV BLD AUTO: 9.4 FL (ref 8.9–12.7)
POTASSIUM SERPL-SCNC: 4 MMOL/L (ref 3.5–5.3)
PR INTERVAL: 140 MS
PROT SERPL-MCNC: 7.3 G/DL (ref 6.4–8.4)
PROTHROMBIN TIME: 13.8 SECONDS (ref 11.6–14.5)
QRS AXIS: 12 DEGREES
QRSD INTERVAL: 68 MS
QT INTERVAL: 398 MS
QTC INTERVAL: 394 MS
RBC # BLD AUTO: 5.1 MILLION/UL (ref 3.81–5.12)
SODIUM SERPL-SCNC: 139 MMOL/L (ref 135–147)
T WAVE AXIS: 23 DEGREES
VENTRICULAR RATE: 59 BPM
WBC # BLD AUTO: 9.42 THOUSAND/UL (ref 4.31–10.16)

## 2024-02-08 PROCEDURE — 86850 RBC ANTIBODY SCREEN: CPT | Performed by: ORTHOPAEDIC SURGERY

## 2024-02-08 PROCEDURE — 36415 COLL VENOUS BLD VENIPUNCTURE: CPT

## 2024-02-08 PROCEDURE — 86901 BLOOD TYPING SEROLOGIC RH(D): CPT | Performed by: ORTHOPAEDIC SURGERY

## 2024-02-08 PROCEDURE — 80053 COMPREHEN METABOLIC PANEL: CPT

## 2024-02-08 PROCEDURE — 85025 COMPLETE CBC W/AUTO DIFF WBC: CPT

## 2024-02-08 PROCEDURE — 83036 HEMOGLOBIN GLYCOSYLATED A1C: CPT

## 2024-02-08 PROCEDURE — 85610 PROTHROMBIN TIME: CPT

## 2024-02-08 PROCEDURE — 86900 BLOOD TYPING SEROLOGIC ABO: CPT | Performed by: ORTHOPAEDIC SURGERY

## 2024-02-08 PROCEDURE — 85730 THROMBOPLASTIN TIME PARTIAL: CPT

## 2024-02-08 NOTE — H&P (VIEW-ONLY)
Internal Medicine Pre-Operative Evaluation:     Reason for Visit: Pre-operative Evaluation for Risk Stratification and Optimization    Patient ID: Jada Lanier is a 78 y.o. female.     Surgery: Arthroplasty of right knee  Referring Provider: Dr Montes      Recommendations to Proceed withSurgery    Patient is considered to be Low risk for Medium risk procedure.     After evaluation and discussion with patient with emphasis that all surgery has some degree of inherent risk it is determined this procedure is of acceptable risk  medically.    Patient may proceed with planned procedure      Assessment    Pre-operative Medical Evaluation for planned surgery  Recommendations as listed in PLAN section below  Contact surgical nurse  navigator with any questions regarding preoperative plan or schedule.      Problem List Items Addressed This Visit          Unprioritized    Dyslipidemia     Continue low cholesterol diet and statin therapy           Essential hypertension     Continue amlodipine and metoprolol throughout surgery without interruption  Hold hydrochlorothiazide and losartan the morning of surgery to lessen risk of acute kidney injury         Type 2 diabetes mellitus without complication, without long-term current use of insulin (HCC) - Primary       Lab Results   Component Value Date    HGBA1C 7.2 (H) 06/21/2023     Hold metformin morning of surgery  Follow consistent carbohydrate diet         Osteoarthritis of right knee     Failed outpatient conservative measures  Elected to undergo total joint arthroplasty            Other Visit Diagnoses       Preop exam for internal medicine              Microcytic anemia  Microcytic anemia dates back to 2015 but has remained stable in the range of 10.1-10.5  Patient asymptomatic  Iron studies mildly low total iron but ferritin within normal limits  Pt has hx of beta thalassemia proven by electrophoresis at [facility]         Plan:     1. Further preoperative workup as  follows:   - none no further testing required may proceed with surgery    2. Medication Management/Recommendations:   - hold ACE / ARB morning of surgery unless given other instructions by your provider  - hold aspirin 7 days prior to surgery  - avoid use of NSAID such as motrin, advil, aleve for 7 days prior to surgery  - hold all OTC herbal or nutritional supplements 7 days before surgery    3. Patient requires further consultation with:   No Consults Required    4. Discharge Planning / Barriers to Discharge  none identified - patients has post discharge therapy plan in place, transportation arranged for discharge day, adequate family support at home to assist with discharge to home.        Subjective:           History of Present Illness:     Jada Lanier is a 78 y.o. female who presents to the office today for a preoperative consultation at the request of surgeon. The patient understands this is an elective procedure and not emergent. They are electing to undergo planned procedure with an understanding that all surgery has inherent risk. They have worked with their surgeon and failed conservative treatment measures. Today they present for preoperative risk assessment and recommendations for optimization in preparation for surgery.    Pt seen in surgical optimization center for upcoming proposed surgery. They have failed previous conservative measures and have elected surgical intervention.     Pt meets presurgical lab and BMI optimization goals.    Upon interview questioning patient is able to perform greater than 4 METs workload in daily life without any reported cardio-pulmonary symptoms.    Hgb chronically low with microcytic indices. Patient with documented history of beta thalassemia        ROS: No TIA's or unusual headaches, no dysphagia.  No prolonged cough. No dyspnea or chest pain on exertion.  No abdominal pain, change in bowel habits, black or bloody stools.  No urinary tract or BPH symptoms.   "Positive reported pain in arthritic joint. Positive difficulty with gait. No skin rashes or issues.      Objective:    /78   Pulse 76   Ht 5' 2\" (1.575 m)   Wt 61.6 kg (135 lb 12.8 oz)   LMP  (LMP Unknown)   SpO2 98%   BMI 24.84 kg/m²       General Appearance: no distress, conversive  HEENT: PERRLA, conjuctiva normal; oropharynx clear; mucous membranes moist;   Neck:  Supple, no lymphadenopathy or thyromegaly  Lungs: breath sounds normal, normal respiratory effort, no retractions, expiratory effort normal  CV: normal heart sounds S1/S2, PMI normal   ABD: soft non tender, no masses , no hepatic or splenomegaly  EXT: DP pulses intact, no lymphadenopathy, no edema  Skin: normal turgor, normal texture, no rash  Psych: affect normal, mood normal  Neuro: AAOx3        The following portions of the patient's history were reviewed and updated as appropriate: allergies, current medications, past family history, past medical history, past social history, past surgical history and problem list.     Past History:       Past Medical History:   Diagnosis Date   • Anemia    • Anxiety    • Colon polyp    • Depression    • Diabetes mellitus (HCC)    • Fibroid     3.2   • GERD (gastroesophageal reflux disease)    • Hyperlipidemia    • Hypertension    • Rheumatoid arthritis (HCC)     rt knee, lt ankle    Past Surgical History:   Procedure Laterality Date   • BUNIONECTOMY Left    • CATARACT EXTRACTION Right    • COLONOSCOPY  2013, 2017   • DILATION AND CURETTAGE OF UTERUS     • ORIF ANKLE FRACTURE Left    • STEROID INJECTION KNEE Right    • TONSILLECTOMY     • TUBAL LIGATION     • WISDOM TOOTH EXTRACTION            Social History     Tobacco Use   • Smoking status: Former     Current packs/day: 0.50     Types: Cigarettes   • Smokeless tobacco: Never   • Tobacco comments:     quit 1981   Vaping Use   • Vaping status: Never Used   Substance Use Topics   • Alcohol use: Yes     Comment: occasional   • Drug use: Never     Family " History   Problem Relation Age of Onset   • Hypertension Mother    • Diabetes Mother    • Glaucoma Mother    • No Known Problems Father    • Alzheimer's disease Brother 87   • Cancer Brother         malignant tumor pharynx   • Colon cancer Other           Allergies:     Allergies   Allergen Reactions   • Shellfish Allergy - Food Allergy Anaphylaxis   • Iodine - Food Allergy Other (See Comments)     Other Reaction(s): Not available, Unknown   • Medical Tape Other (See Comments)   • Shellfish-Derived Products - Food Allergy    • Other Rash     Other reaction(s): bandaids        Current Medications:     Current Outpatient Medications   Medication Instructions   • amLODIPine (NORVASC) 2.5 mg tablet TAKE 1 TABLET(2.5 MG) BY MOUTH DAILY   • ascorbic acid (VITAMIN C) 500 mg, Oral, 2 times daily   • aspirin (ECOTRIN LOW STRENGTH) 81 mg EC tablet 1 capsule, Oral, Daily   • atorvastatin (LIPITOR) 10 mg tablet TK 1 T PO QD   • calcium carbonate (OS-MARTÍN) 600 mg, Oral, 2 times daily with meals   • Diclofenac Sodium (VOLTAREN) 1 % Apply topically   • Ferrex 150 Forte 150-1-25 MG-MG-MCG CAPS 1 capsule, Oral, Daily   • ferrous sulfate 324 mg, Oral, 2 times daily before meals   • folic acid (FOLVITE) 1 mg, Oral, Daily   • glucose blood test strip 1 each, Daily   • glucose blood test strip 1 each, Other, Daily, One Touch Ultra Blue   • losartan-hydrochlorothiazide (HYZAAR) 100-12.5 MG per tablet Every 24 hours   • Lumigan 0.01 % ophthalmic drops PLACE 1 DROP IN BOTH EYES DAILY AT BEDTIME   • metFORMIN (GLUCOPHAGE-XR) 500 mg 24 hr tablet TAKE 1 TABLET(500 MG) BY MOUTH TWICE DAILY WITH MEALS   • metoprolol succinate (TOPROL-XL) 25 mg 24 hr tablet TAKE 1 TABLET(25 MG) BY MOUTH DAILY   • mometasone (ELOCON) 0.1 % lotion Topical, Daily PRN   • Multiple Vitamins-Minerals (multivitamin with minerals) tablet 1 tablet, Oral, Daily   • multivitamin (THERAGRAN) TABS 1 tablet, Oral, Daily   • Omega-3 Fatty Acids (FISH OIL PO) Oral, Daily   •  "OneTouch Delica Lancets 30G MISC Test twice daily   • OneTouch Ultra test strip TEST ONCE DAILY   • pantoprazole (PROTONIX) 40 mg, Oral, Daily   • potassium chloride (K-DUR,KLOR-CON) 10 mEq tablet TAKE 1 TABLET(10 MEQ) BY MOUTH DAILY   • potassium chloride (MICRO-K) 10 MEQ CR capsule 10 mEq, Daily   • Propylene Glycol-Glycerin (Artificial Tears) 1-0.3 % SOLN 1 drop into affected eye as needed   • timolol (TIMOPTIC) 0.5 % ophthalmic solution 1 drop, Both Eyes, Daily   • traMADol (ULTRAM) 50 mg tablet            PRE-OP WORKSHEET DATA    Assessment of Pre-Operative Risks     MLJ Quality Hard Stops:  BMI (<40) : Estimated body mass index is 24.84 kg/m² as calculated from the following:    Height as of this encounter: 5' 2\" (1.575 m).    Weight as of this encounter: 61.6 kg (135 lb 12.8 oz).    Hgb ( >11):   Lab Results   Component Value Date    HGB 10.1 (L) 02/08/2024     HbA1c (<7.5) :   Lab Results   Component Value Date    HGBA1C 7.4 (H) 02/08/2024     GFR (>60) (Less then 45 = Nephrology consult):  CrCl cannot be calculated (Patient's most recent lab result is older than the maximum 7 days allowed.).      Active Decompensated Chronic Conditions which would delay surgery  No acutely decompensated medical issues such as recent CVA, MI, new onset arrhythmia, severe aortic stenosis, CHF, uncontrolled COPD       Exercise Capacity: (if less the 4 mets consider functional assessment via cardiac stress testing or consultation)    Able to walk 2 blocks without symptoms?: Yes  Able to walk 1 flights without symptoms?: Yes    Assessment of intra and post operative respiratory, hemodynamic and thrombotic risks     Prior Anesthesia Reactions: No     Personal history of venous thromboembolic disease? No    History of steroid use > 5 mg for >2 weeks within last year? No      The patient's risk factors for cardiac complications include :  none    Jada Lanier has an IN HOSPITAL cardiac risk of RCI RISK CLASS I (0 risk factors, " risk of major cardiac compl. appr. 0.5%) based on RCRI calculator    Cardiac Risk Estimation: per the Revised Cardiac Risk Index (Circ. 100:1043, 1999),        Pre-Op Data Reviewed:       Laboratory Results: I have personally reviewed the pertinent laboratory results/reports     EKG:I have personally reviewed pertinent reports.  . I personally reviewed and interpreted available tracings in the electronic medical record    Sinus bradycardia  Baseline artifact  Otherwise normal ECG     No previous ECGs available  Confirmed by Raleigh Orantes ([id number]) on 2/8/2024 5:41:10 PM    OLD RECORDS: reviewed old records in the chart review section if EHR on day of visit.    Previous cardiopulmonary studies within the past year:  Echocardiogram: no  Cardiac Catheterization: no  Stress Test: yes, 2022 no ischemic changes      Time of visit including pre-visit chart review, visit and post-visit coordination of plan and care , review of pre-surgical lab work, preparation and time spent documenting note in electronic medical record, time spent face-to-face in physical examination answering patient questions by care team 45 minutes             [facility]

## 2024-02-08 NOTE — PATIENT INSTRUCTIONS
Contact surgical nurse  navigator with any questions regarding preoperative plan or schedule.  Stop all over the counter supplements, herbal, naturopathic  medications for 1 week prior to surgery UNLESS prescribed by your surgeon  Hold NSAIDS (i.e. advil, alleve, motrin, ibuprofen, celebrex) minimum 7 days prior to surgery  Hold Asprin minimum 7 days prior to surgery  Recommend using Tylenol ( acetaminophen ) 500mg every eight hours during the first week post discharge in conjunction with any additional pain medicine prescribed by your surgeon  Use bowel medicines prescribed by your surgeon ( colace) daily post op during the first 1-2 weeks to avoid post operative constipation issues  Call [phone] with any post discharge concerns or medical issues  The morning of surgery take only the following medication with small sip of water: Amlodipine, metoprolol  Hold losartan hydrochlorothiazide, metformin and all other medications other than those outlined above on the morning of surgery

## 2024-02-08 NOTE — ASSESSMENT & PLAN NOTE
Continue amlodipine and metoprolol throughout surgery without interruption  Hold hydrochlorothiazide and losartan the morning of surgery to lessen risk of acute kidney injury

## 2024-02-08 NOTE — PROGRESS NOTES
Internal Medicine Pre-Operative Evaluation:     Reason for Visit: Pre-operative Evaluation for Risk Stratification and Optimization    Patient ID: Jada Lanier is a 78 y.o. female.     Surgery: Arthroplasty of right knee  Referring Provider: Dr Montes      Recommendations to Proceed withSurgery    Patient is considered to be Low risk for Medium risk procedure.     After evaluation and discussion with patient with emphasis that all surgery has some degree of inherent risk it is determined this procedure is of acceptable risk  medically.    Patient may proceed with planned procedure      Assessment    Pre-operative Medical Evaluation for planned surgery  Recommendations as listed in PLAN section below  Contact surgical nurse  navigator with any questions regarding preoperative plan or schedule.      Problem List Items Addressed This Visit          Unprioritized    Dyslipidemia     Continue low cholesterol diet and statin therapy           Essential hypertension     Continue amlodipine and metoprolol throughout surgery without interruption  Hold hydrochlorothiazide and losartan the morning of surgery to lessen risk of acute kidney injury         Type 2 diabetes mellitus without complication, without long-term current use of insulin (HCC) - Primary       Lab Results   Component Value Date    HGBA1C 7.2 (H) 06/21/2023     Hold metformin morning of surgery  Follow consistent carbohydrate diet         Osteoarthritis of right knee     Failed outpatient conservative measures  Elected to undergo total joint arthroplasty            Other Visit Diagnoses       Preop exam for internal medicine              Microcytic anemia  Microcytic anemia dates back to 2015 but has remained stable in the range of 10.1-10.5  Patient asymptomatic  Iron studies mildly low total iron but ferritin within normal limits  Pt has hx of beta thalassemia proven by electrophoresis at [facility]         Plan:     1. Further preoperative workup as  follows:   - none no further testing required may proceed with surgery    2. Medication Management/Recommendations:   - hold ACE / ARB morning of surgery unless given other instructions by your provider  - hold aspirin 7 days prior to surgery  - avoid use of NSAID such as motrin, advil, aleve for 7 days prior to surgery  - hold all OTC herbal or nutritional supplements 7 days before surgery    3. Patient requires further consultation with:   No Consults Required    4. Discharge Planning / Barriers to Discharge  none identified - patients has post discharge therapy plan in place, transportation arranged for discharge day, adequate family support at home to assist with discharge to home.        Subjective:           History of Present Illness:     Jada Lanier is a 78 y.o. female who presents to the office today for a preoperative consultation at the request of surgeon. The patient understands this is an elective procedure and not emergent. They are electing to undergo planned procedure with an understanding that all surgery has inherent risk. They have worked with their surgeon and failed conservative treatment measures. Today they present for preoperative risk assessment and recommendations for optimization in preparation for surgery.    Pt seen in surgical optimization center for upcoming proposed surgery. They have failed previous conservative measures and have elected surgical intervention.     Pt meets presurgical lab and BMI optimization goals.    Upon interview questioning patient is able to perform greater than 4 METs workload in daily life without any reported cardio-pulmonary symptoms.    Hgb chronically low with microcytic indices. Patient with documented history of beta thalassemia        ROS: No TIA's or unusual headaches, no dysphagia.  No prolonged cough. No dyspnea or chest pain on exertion.  No abdominal pain, change in bowel habits, black or bloody stools.  No urinary tract or BPH symptoms.   "Positive reported pain in arthritic joint. Positive difficulty with gait. No skin rashes or issues.      Objective:    /78   Pulse 76   Ht 5' 2\" (1.575 m)   Wt 61.6 kg (135 lb 12.8 oz)   LMP  (LMP Unknown)   SpO2 98%   BMI 24.84 kg/m²       General Appearance: no distress, conversive  HEENT: PERRLA, conjuctiva normal; oropharynx clear; mucous membranes moist;   Neck:  Supple, no lymphadenopathy or thyromegaly  Lungs: breath sounds normal, normal respiratory effort, no retractions, expiratory effort normal  CV: normal heart sounds S1/S2, PMI normal   ABD: soft non tender, no masses , no hepatic or splenomegaly  EXT: DP pulses intact, no lymphadenopathy, no edema  Skin: normal turgor, normal texture, no rash  Psych: affect normal, mood normal  Neuro: AAOx3        The following portions of the patient's history were reviewed and updated as appropriate: allergies, current medications, past family history, past medical history, past social history, past surgical history and problem list.     Past History:       Past Medical History:   Diagnosis Date   • Anemia    • Anxiety    • Colon polyp    • Depression    • Diabetes mellitus (HCC)    • Fibroid     3.2   • GERD (gastroesophageal reflux disease)    • Hyperlipidemia    • Hypertension    • Rheumatoid arthritis (HCC)     rt knee, lt ankle    Past Surgical History:   Procedure Laterality Date   • BUNIONECTOMY Left    • CATARACT EXTRACTION Right    • COLONOSCOPY  2013, 2017   • DILATION AND CURETTAGE OF UTERUS     • ORIF ANKLE FRACTURE Left    • STEROID INJECTION KNEE Right    • TONSILLECTOMY     • TUBAL LIGATION     • WISDOM TOOTH EXTRACTION            Social History     Tobacco Use   • Smoking status: Former     Current packs/day: 0.50     Types: Cigarettes   • Smokeless tobacco: Never   • Tobacco comments:     quit 1981   Vaping Use   • Vaping status: Never Used   Substance Use Topics   • Alcohol use: Yes     Comment: occasional   • Drug use: Never     Family " History   Problem Relation Age of Onset   • Hypertension Mother    • Diabetes Mother    • Glaucoma Mother    • No Known Problems Father    • Alzheimer's disease Brother 87   • Cancer Brother         malignant tumor pharynx   • Colon cancer Other           Allergies:     Allergies   Allergen Reactions   • Shellfish Allergy - Food Allergy Anaphylaxis   • Iodine - Food Allergy Other (See Comments)     Other Reaction(s): Not available, Unknown   • Medical Tape Other (See Comments)   • Shellfish-Derived Products - Food Allergy    • Other Rash     Other reaction(s): bandaids        Current Medications:     Current Outpatient Medications   Medication Instructions   • amLODIPine (NORVASC) 2.5 mg tablet TAKE 1 TABLET(2.5 MG) BY MOUTH DAILY   • ascorbic acid (VITAMIN C) 500 mg, Oral, 2 times daily   • aspirin (ECOTRIN LOW STRENGTH) 81 mg EC tablet 1 capsule, Oral, Daily   • atorvastatin (LIPITOR) 10 mg tablet TK 1 T PO QD   • calcium carbonate (OS-MARTÍN) 600 mg, Oral, 2 times daily with meals   • Diclofenac Sodium (VOLTAREN) 1 % Apply topically   • Ferrex 150 Forte 150-1-25 MG-MG-MCG CAPS 1 capsule, Oral, Daily   • ferrous sulfate 324 mg, Oral, 2 times daily before meals   • folic acid (FOLVITE) 1 mg, Oral, Daily   • glucose blood test strip 1 each, Daily   • glucose blood test strip 1 each, Other, Daily, One Touch Ultra Blue   • losartan-hydrochlorothiazide (HYZAAR) 100-12.5 MG per tablet Every 24 hours   • Lumigan 0.01 % ophthalmic drops PLACE 1 DROP IN BOTH EYES DAILY AT BEDTIME   • metFORMIN (GLUCOPHAGE-XR) 500 mg 24 hr tablet TAKE 1 TABLET(500 MG) BY MOUTH TWICE DAILY WITH MEALS   • metoprolol succinate (TOPROL-XL) 25 mg 24 hr tablet TAKE 1 TABLET(25 MG) BY MOUTH DAILY   • mometasone (ELOCON) 0.1 % lotion Topical, Daily PRN   • Multiple Vitamins-Minerals (multivitamin with minerals) tablet 1 tablet, Oral, Daily   • multivitamin (THERAGRAN) TABS 1 tablet, Oral, Daily   • Omega-3 Fatty Acids (FISH OIL PO) Oral, Daily   •  "OneTouch Delica Lancets 30G MISC Test twice daily   • OneTouch Ultra test strip TEST ONCE DAILY   • pantoprazole (PROTONIX) 40 mg, Oral, Daily   • potassium chloride (K-DUR,KLOR-CON) 10 mEq tablet TAKE 1 TABLET(10 MEQ) BY MOUTH DAILY   • potassium chloride (MICRO-K) 10 MEQ CR capsule 10 mEq, Daily   • Propylene Glycol-Glycerin (Artificial Tears) 1-0.3 % SOLN 1 drop into affected eye as needed   • timolol (TIMOPTIC) 0.5 % ophthalmic solution 1 drop, Both Eyes, Daily   • traMADol (ULTRAM) 50 mg tablet            PRE-OP WORKSHEET DATA    Assessment of Pre-Operative Risks     MLJ Quality Hard Stops:  BMI (<40) : Estimated body mass index is 24.84 kg/m² as calculated from the following:    Height as of this encounter: 5' 2\" (1.575 m).    Weight as of this encounter: 61.6 kg (135 lb 12.8 oz).    Hgb ( >11):   Lab Results   Component Value Date    HGB 10.1 (L) 02/08/2024     HbA1c (<7.5) :   Lab Results   Component Value Date    HGBA1C 7.4 (H) 02/08/2024     GFR (>60) (Less then 45 = Nephrology consult):  CrCl cannot be calculated (Patient's most recent lab result is older than the maximum 7 days allowed.).      Active Decompensated Chronic Conditions which would delay surgery  No acutely decompensated medical issues such as recent CVA, MI, new onset arrhythmia, severe aortic stenosis, CHF, uncontrolled COPD       Exercise Capacity: (if less the 4 mets consider functional assessment via cardiac stress testing or consultation)    Able to walk 2 blocks without symptoms?: Yes  Able to walk 1 flights without symptoms?: Yes    Assessment of intra and post operative respiratory, hemodynamic and thrombotic risks     Prior Anesthesia Reactions: No     Personal history of venous thromboembolic disease? No    History of steroid use > 5 mg for >2 weeks within last year? No      The patient's risk factors for cardiac complications include :  none    Jada Lanier has an IN HOSPITAL cardiac risk of RCI RISK CLASS I (0 risk factors, " risk of major cardiac compl. appr. 0.5%) based on RCRI calculator    Cardiac Risk Estimation: per the Revised Cardiac Risk Index (Circ. 100:1043, 1999),        Pre-Op Data Reviewed:       Laboratory Results: I have personally reviewed the pertinent laboratory results/reports     EKG:I have personally reviewed pertinent reports.  . I personally reviewed and interpreted available tracings in the electronic medical record    Sinus bradycardia  Baseline artifact  Otherwise normal ECG     No previous ECGs available  Confirmed by Raleigh Orantes ([id number]) on 2/8/2024 5:41:10 PM    OLD RECORDS: reviewed old records in the chart review section if EHR on day of visit.    Previous cardiopulmonary studies within the past year:  Echocardiogram: no  Cardiac Catheterization: no  Stress Test: yes, 2022 no ischemic changes      Time of visit including pre-visit chart review, visit and post-visit coordination of plan and care , review of pre-surgical lab work, preparation and time spent documenting note in electronic medical record, time spent face-to-face in physical examination answering patient questions by care team 45 minutes             [facility]

## 2024-02-08 NOTE — TELEPHONE ENCOUNTER
L/m for pt informing her that the refill for multi-vitamins has been sent to her pharmacy and, per the provider, she can take either strength of folic acid that she has.  Provided my direct call back number for any questions.

## 2024-02-08 NOTE — ASSESSMENT & PLAN NOTE
Lab Results   Component Value Date    HGBA1C 7.2 (H) 06/21/2023     Hold metformin morning of surgery  Follow consistent carbohydrate diet

## 2024-02-09 ENCOUNTER — LAB REQUISITION (OUTPATIENT)
Dept: LAB | Facility: HOSPITAL | Age: 79
End: 2024-02-09
Payer: MEDICARE

## 2024-02-09 DIAGNOSIS — M17.11 UNILATERAL PRIMARY OSTEOARTHRITIS, RIGHT KNEE: ICD-10-CM

## 2024-02-10 LAB
ABO GROUP BLD: NORMAL
BLD GP AB SCN SERPL QL: NEGATIVE
RH BLD: POSITIVE
SPECIMEN EXPIRATION DATE: NORMAL

## 2024-02-11 PROBLEM — M35.01 SICCA SYNDROME WITH KERATOCONJUNCTIVITIS (HCC): Status: RESOLVED | Noted: 2023-12-13 | Resolved: 2024-02-11

## 2024-02-11 PROBLEM — H16.229 SICCA SYNDROME WITH KERATOCONJUNCTIVITIS: Status: RESOLVED | Noted: 2023-12-13 | Resolved: 2024-02-11

## 2024-02-14 ENCOUNTER — PATIENT OUTREACH (OUTPATIENT)
Dept: OBGYN CLINIC | Facility: HOSPITAL | Age: 79
End: 2024-02-14

## 2024-02-14 NOTE — PROGRESS NOTES
SWCM contacted pt today to follow up in regard to caregiver support/ transportation. Per pt her gentleman friend will be taking her to and from procedure. He will also help postoperatively as possible. He will get pt settled in after surgery. Pt shared today she did call [employer] and they require $95.00 to come out and access home and then 32.00- 35.00 pr hour and they require four hour time slots. Pt feels she will not need their services. Pt believes her niece will come in the mornings to help her before her niece goes to work and then her gentleman friend will come at 6 pm and they do dinner etc... Pt will confirm plan. It is also noted by CMOC pt was approved for [employer] to use postoperatively for OP PT. [facility] also inquired if pt has been contacted by [employer], but she declined their services a sit required too much paperwork. Pt states she has been stocking up on frozen tv dinners. Her niece and gentleman friend will also be bringing pt fresh meals. Pt also shared she has a subscription with [employer] for grocery delivery. Pt reports no other needs at this time. CM will continue to follow and assist pt as needed. SWCM encouraged pt to speak to her niece and gentleman friend to confirm a caregiver support plan. Pt expressed understanding.

## 2024-02-14 NOTE — PROGRESS NOTES
Outgoing Calls:  02/14/2024    CMOC called [employer] to see if patient's application was recieved or approved. CMOC spoke with Gonzalo from registration. Per Gonzalo, patient was approved and can ride. However, patient will have to pay for medical and non-medical trips.    CMOC called patient to tell her the above information. CMOC tried giving patient [employer]'s contact information. However, patient was just waking up and did not have anything to write the number down. Patient asked CMOC to call her back and leave a voicemail with [employer]'s number.    CMOC called patient back per request. CMOC left a voicemail for patient with [employer]'s phone number.    Patient's needs have been met. Patient does not have anymore needs at this time. CMOC will take name off care team and close CHW referral.    No further outreach is needed.    Addendum:  MARJ Hernandez sent OC a TEAMS message stating that patient called [employer] to get an application sent to her. MARJ MATHEWS saw CMOC's note from this morning stating that patient was approved. MARJ MATHEWS asked CMOC to call patient back to explain again and answer any questions.    CMOC called patient to explain that she is registered to ride. Patient said she remembers CMOC calling this morning but could not remember what we spoke about due to being sleepy. CMOC told patient how to schedule a ride and answered the questions she had.

## 2024-02-15 ENCOUNTER — CONSULT (OUTPATIENT)
Dept: ANESTHESIOLOGY | Facility: CLINIC | Age: 79
End: 2024-02-15
Payer: MEDICARE

## 2024-02-15 ENCOUNTER — OFFICE VISIT (OUTPATIENT)
Age: 79
End: 2024-02-15
Payer: MEDICARE

## 2024-02-15 VITALS
BODY MASS INDEX: 24.99 KG/M2 | HEIGHT: 62 IN | OXYGEN SATURATION: 98 % | DIASTOLIC BLOOD PRESSURE: 78 MMHG | SYSTOLIC BLOOD PRESSURE: 142 MMHG | HEART RATE: 76 BPM | WEIGHT: 135.8 LBS

## 2024-02-15 DIAGNOSIS — D64.9 ANEMIA, UNSPECIFIED TYPE: Primary | ICD-10-CM

## 2024-02-15 DIAGNOSIS — E78.5 DYSLIPIDEMIA: ICD-10-CM

## 2024-02-15 DIAGNOSIS — E11.9 TYPE 2 DIABETES MELLITUS WITHOUT COMPLICATION, WITHOUT LONG-TERM CURRENT USE OF INSULIN (HCC): Primary | ICD-10-CM

## 2024-02-15 DIAGNOSIS — M17.11 PRIMARY OSTEOARTHRITIS OF RIGHT KNEE: ICD-10-CM

## 2024-02-15 DIAGNOSIS — I10 ESSENTIAL HYPERTENSION: ICD-10-CM

## 2024-02-15 DIAGNOSIS — Z01.818 PREOP EXAM FOR INTERNAL MEDICINE: ICD-10-CM

## 2024-02-15 DIAGNOSIS — M17.11 OSTEOARTHRITIS OF RIGHT KNEE, UNSPECIFIED OSTEOARTHRITIS TYPE: ICD-10-CM

## 2024-02-15 DIAGNOSIS — Z01.89 ENCOUNTER FOR GERIATRIC ASSESSMENT: ICD-10-CM

## 2024-02-15 PROCEDURE — 99213 OFFICE O/P EST LOW 20 MIN: CPT | Performed by: NURSE PRACTITIONER

## 2024-02-15 PROCEDURE — 99215 OFFICE O/P EST HI 40 MIN: CPT | Performed by: INTERNAL MEDICINE

## 2024-02-15 NOTE — PROGRESS NOTES
[facility] ([facility]  CONSULT: GERIATRIC SURGERY   SOC ANEMIA       Assessment/Plan:  78 year old female referred to SOC for pre-surgery geriatric screening and SOC anemia review.  This SOC appointment is strictly for a geriatric assessment 2.2 advanced age.  Patient was previously seen in SOC by Dr. Shah and his team for medical clearance. Please refer to his note for medical history.  Consult concerns: age   Patient is scheduled on    Case: [id number] Date/Time: 03/06/24 1100   Procedure: ARTHROPLASTY KNEE TOTAL and all associated procedures (Right: Knee)   Anesthesia type: Choice   Diagnosis: Primary osteoarthritis of right knee [M17.11]   Pre-op diagnosis: Primary osteoarthritis of right knee [M17.11]   Location:  OR ROOM 01 / [facility]   Surgeons: PAT Perea       No problem-specific Assessment & Plan notes found for this encounter.    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 Reasonable to recheck ferritin (was > 50 for all of 2023). Will recommend treating will IV iron pre-op if ferritin < 30.  Patient is going for labs 2.16.24    RECEIVED    METS 6   ADMITS TO BEING IN WELL HEALTH TODAY   Stated on BEST  At risk for post-op MIO -NO  At risk for post-op SSI -NO   BEST   Breathing- instructed to exercise lungs prior to surgery via IS  Eat- discussed increasing protein intake prior to surgery   Sleep/stress- encouraged 8-10 sleep @ night, stress reduction, avoid sick contacts and handwashing   Train- encouraged to remain active       Other Visit Diagnoses       Anemia, unspecified type    -  Primary    Relevant Orders    Iron Panel (Includes Ferritin, Iron Sat%, Iron, and TIBC)  PATIENT IS GOING FOR LABS 2.16.24 WILL RECOMMEND PRE-SURGERY  IV IRON IF FERRITIN <30  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.  ferritin (was > 50 for all of 2023).   Will recommend treating will IV iron pre-op if ferritin < 30.    Patient is going for labs 2.16.24      Encounter for geriatric assessment        Relevant Orders    Iron Panel (Includes Ferritin, Iron Sat%, Iron, and TIBC)  NO COGNITIVE CONCERNS IDENTIFIED TODAY   NO IMMEDIATE GERIATRIC CONCERNS TODAY   See Geriatric Assessment below...  Cognitive Assessment: NO CONCERNS   Falls (last 6 months): yes, no injuries  Hand  score:12  -Attempt 1:12  -Attempt 2:12  -Attempt 3:12  El Total Score: 17  PHQ- 9 Depression Scale: 1  Nutrition Assessment Score:14  METS:6.79  Incentive Spirometry Level: 1500  Health goals:  -What are your overall health goals? Walk better     -What brings you strength? Eusebio      -What activities are important to you? TV, newspaper                         Subjective:      Patient ID: Jada Lanier is a 78 y.o. female who was referred to SOC for pre-surgery geriatric screening & SOC anemia review    I met patient in the SOC, she arrived with her friend,  both were pleasant and a pleasure to care for.  Patient admits to being in well health.  She has has on going right knee pain and is electing to have a right knee replacement.  Ordered delirium precautions on admit 2.2 advanced age.      Surgery optimization   MIO - LOW   SSI- LOW   SOC ANEMIA- PENDING LABS   As always we discussed having your BEST surgery, and BEST recovery.  Surgery goals reviewed today.      Breathing exercises   Patient was encouraged to begin lung exercises today.  This could be accomplished through deep breathing and cough exercises.  Patient was taught how to use an incentive spirometer.  Return demonstration provided.    Eating/nutrition   Encouraged patient to increase oral protein intake prior to surgery.  Based on current weight of..., patient was instructed to consume...  Grams of  protein a day.  This can be accomplished by consuming chicken, fish, tuna fish, cottage cheese, cheese, eggs, Greek yogurt, and protein shakes as needed.  I encouraged use of protein shakes such ENLIVE.  I also recommended making your own protein shakes with protein powder.   Sleep/Stress management  Patient was encouraged to rest their body prior to surgery.  Encouraged attempting to get 8 hours of sleep at night.  Avoid stress.  Avoid sick contacts.  Encouraged to find a relaxing hobby such as reading, meditation, listening to music.  Training exercises  Patient was encouraged to remain active as possible.  Today bilateral lower extremity generic exercises were taught for muscle strengthening and balance.  All exercises to be done sitting down.       The following portions of the patient's history were reviewed and updated as appropriate: allergies, current medications, past family history, past medical history, past social history, past surgical history, and problem list.    Review of Systems   Constitutional:  Negative for chills and fever.   Gastrointestinal:  Negative for diarrhea, nausea and vomiting.   Skin:  Negative for color change, pallor, rash and wound.   Neurological:  Negative for dizziness.   Psychiatric/Behavioral:  Negative for confusion, decreased concentration, dysphoric mood and hallucinations.    All other systems reviewed and are negative.        Objective:      LMP  (LMP Unknown)          Physical Exam  HENT:      Head: Normocephalic.   Neurological:      General: No focal deficit present.      Mental Status: She is alert and oriented to person, place, and time. Mental status is at baseline.   Psychiatric:         Mood and Affect: Mood normal.         Behavior: Behavior normal.         Thought Content: Thought content normal.         Judgment: Judgment normal.

## 2024-02-15 NOTE — PROGRESS NOTES
See Geriatric Assessment below...  Cognitive Assessment:   CAM:   TUG <15 sec:  Falls (last 6 months): yes, no injuries  Hand  score:12  -Attempt 1:12  -Attempt 2:12  -Attempt 3:12  El Total Score: 17  PHQ- 9 Depression Scale: 1  Nutrition Assessment Score:14  METS:6.79  Incentive Spirometry Level: 1500  Health goals:  -What are your overall health goals? Walk better    -What brings you strength? Eusebio     -What activities are important to you? TV, newspaper     As always we discussed having your BEST surgery, and BEST recovery.  Surgery goals reviewed today.      Breathing exercises   Patient was encouraged to begin lung exercises today.  This could be accomplished through deep breathing and cough exercises.  Patient was taught how to use an incentive spirometer.  Return demonstration provided.    Eating/nutrition   Encouraged patient to increase oral protein intake prior to surgery.  This can be accomplished by consuming chicken, fish, tuna fish, cottage cheese, cheese, eggs, Greek yogurt, and protein shakes as needed.  I encouraged use of protein shakes such ENLIVE.  I also recommended making your own protein shakes with protein powder.   Sleep/Stress management  Patient was encouraged to rest their body prior to surgery.  Encouraged attempting to get 8 hours of sleep at night.  Avoid stress.  Avoid sick contacts.  Encouraged to find a relaxing hobby such as reading, meditation, listening to music.  Training exercises  Patient was encouraged to remain active as possible.  Today bilateral lower extremity generic exercises were taught for muscle strengthening and balance.  All exercises to be done sitting down.

## 2024-02-16 ENCOUNTER — ANESTHESIA EVENT (OUTPATIENT)
Dept: PERIOP | Facility: HOSPITAL | Age: 79
End: 2024-02-16
Payer: MEDICARE

## 2024-02-16 NOTE — PRE-PROCEDURE INSTRUCTIONS
Pre-Surgery Instructions:   Medication Instructions    amLODIPine (NORVASC) 2.5 mg tablet Take day of surgery.    ascorbic acid (VITAMIN C) 500 MG tablet Hold day of surgery.    aspirin (ECOTRIN LOW STRENGTH) 81 mg EC tablet Stop 7 days prior  per MD    atorvastatin (LIPITOR) 10 mg tablet Take night before surgery    calcium carbonate (OS-MARTÍN) 600 MG tablet Stop taking 7 days prior to surgery.    Diclofenac Sodium (VOLTAREN) 1 % Stop taking 3 days prior to surgery.    ferrous sulfate 324 (65 Fe) mg Hold day of surgery.    folic acid (FOLVITE) 1 mg tablet Hold day of surgery.    losartan-hydrochlorothiazide (HYZAAR) 100-12.5 MG per tablet Hold day of surgery.    Lumigan 0.01 % ophthalmic drops Take day of surgery.    metFORMIN (GLUCOPHAGE-XR) 500 mg 24 hr tablet Hold day of surgery.    metoprolol succinate (TOPROL-XL) 25 mg 24 hr tablet Take day of surgery.    Multiple Vitamins-Minerals (multivitamin with minerals) tablet Hold day of surgery.    Omega-3 Fatty Acids (FISH OIL PO) Stop taking 7 days prior to surgery.    potassium chloride (MICRO-K) 10 MEQ CR capsule Hold day of surgery.    Propylene Glycol-Glycerin (Artificial Tears) 1-0.3 % SOLN Take day of surgery.    timolol (TIMOPTIC) 0.5 % ophthalmic solution Take day of surgery.   Medication instructions for day surgery reviewed. Please use only a sip of water to take your instructed medications. Avoid all over the counter vitamins, supplements and NSAIDS for one week prior to surgery per anesthesia guidelines. Tylenol is ok to take as needed.     You will receive a call one business day prior to surgery with an arrival time and hospital directions. If your surgery is scheduled on a Monday, the hospital will be calling you on the Friday prior to your surgery. If you have not heard from anyone by 8pm, please call the hospital supervisor through the hospital  at [phone] or [place] [phone]).    Do not eat or drink anything after midnight the night  "before your surgery, including candy, mints, lifesavers, or chewing gum. Do not drink alcohol 24hrs before your surgery. Try not to smoke at least 24hrs before your surgery.       Follow the pre surgery showering instructions as listed in the “My Surgical Experience Booklet” or otherwise provided by your surgeon's office. Do not use a blade to shave the surgical area 1 week before surgery. It is okay to use a clean electric clippers up to 24 hours before surgery. Do not apply any lotions, creams, including makeup, cologne, deodorant, or perfumes after showering on the day of your surgery. Do not use dry shampoo, hair spray, hair gel, or any type of hair products.     No contact lenses, eye make-up, or artificial eyelashes. Remove nail polish, including gel polish, and any artificial, gel, or acrylic nails if possible. Remove all jewelry including rings and body piercing jewelry.     Wear causal clothing that is easy to take on and off. Consider your type of surgery.    Keep any valuables, jewelry, piercings at home. Please bring any specially ordered equipment (sling, braces) if indicated.    Arrange for a responsible person to drive you to and from the hospital on the day of your surgery. Visitor Guidelines discussed.     Call the surgeon's office with any new illnesses, exposures, or additional questions prior to surgery.    Reviewed with patient, in detail, instructions from \"My Surgical Experience\". Verified with patient that he received [facility] patient education from surgeon's office. Advised to call ortho office/surgery coordinator with any questions and/or concerns.   Confirmed that patient has hibiclens soap and CHG wipes. Incentive spirometer received.   Patient verbalized understanding of current visitor restrictions due to Covid and will clarify with nurse DOS. Instructed to avoid all ASA and OTC Vit/Supp 1 week prior to surgery and to avoid NSAIDs 7 days prior to surgery per anesthesia guidelines.Tylenol ok " to take prn.   No alcohol 24 hours prior to surgery. Patient aware Lovenox or other Blood Thinner prescribed is for POST OP ONLY. Instructed to call surgeon's office in meantime with any new illness.  Patient verbalized an understanding of all instructions reviewed and offers no concerns at this time.    Please reference your “My Surgical Experience Booklet” for additional information to prepare for your upcoming surgery.

## 2024-02-28 ENCOUNTER — PATIENT OUTREACH (OUTPATIENT)
Dept: OBGYN CLINIC | Facility: HOSPITAL | Age: 79
End: 2024-02-28

## 2024-02-28 NOTE — PROGRESS NOTES
[facility] attempted to reach pt to confirm caregiver support plan for after surgery on 03/06/2024. When I last spoke with pt her plan is for her friend and niece to both provide caregiver support to pt. Pt had declined self pay [facility] services and [employer]. Pt was approved for [facility] services. I was unable to reach pt today and a message was left to please return [facility] call. [facility] will remain available.

## 2024-03-05 ENCOUNTER — TELEPHONE (OUTPATIENT)
Dept: OBGYN CLINIC | Facility: CLINIC | Age: 79
End: 2024-03-05

## 2024-03-05 NOTE — TELEPHONE ENCOUNTER
Returned pt's call and told her that Dr. Montes is aware of the leg length discrepancy.  Pt had several other questions about her surgery tomorrow, 3/6/24.  All questions answered to pt's satisfaction.

## 2024-03-05 NOTE — TELEPHONE ENCOUNTER
Pt l/m stating that her podiatrist mentioned that her right leg is shorter than her left leg and to make sure that Dr. Montes is aware.  Pt having Rt TKA on 3/6/24.  Message sent to provider.

## 2024-03-06 ENCOUNTER — ANESTHESIA (OUTPATIENT)
Dept: PERIOP | Facility: HOSPITAL | Age: 79
End: 2024-03-06
Payer: MEDICARE

## 2024-03-06 ENCOUNTER — PATIENT OUTREACH (OUTPATIENT)
Dept: OBGYN CLINIC | Facility: HOSPITAL | Age: 79
End: 2024-03-06

## 2024-03-06 ENCOUNTER — HOSPITAL ENCOUNTER (OUTPATIENT)
Facility: HOSPITAL | Age: 79
Setting detail: OUTPATIENT SURGERY
Discharge: HOME | End: 2024-03-06
Attending: ORTHOPAEDIC SURGERY | Admitting: ORTHOPAEDIC SURGERY
Payer: MEDICARE

## 2024-03-06 VITALS
OXYGEN SATURATION: 100 % | WEIGHT: 130.4 LBS | BODY MASS INDEX: 24 KG/M2 | RESPIRATION RATE: 18 BRPM | HEART RATE: 54 BPM | DIASTOLIC BLOOD PRESSURE: 79 MMHG | SYSTOLIC BLOOD PRESSURE: 122 MMHG | HEIGHT: 62 IN | TEMPERATURE: 97.6 F

## 2024-03-06 DIAGNOSIS — Z96.651 STATUS POST TOTAL KNEE REPLACEMENT USING CEMENT, RIGHT: Primary | ICD-10-CM

## 2024-03-06 LAB
GLUCOSE SERPL-MCNC: 139 MG/DL (ref 65–140)
GLUCOSE SERPL-MCNC: 178 MG/DL (ref 65–140)

## 2024-03-06 PROCEDURE — 97162 PT EVAL MOD COMPLEX 30 MIN: CPT

## 2024-03-06 PROCEDURE — 97116 GAIT TRAINING THERAPY: CPT

## 2024-03-06 PROCEDURE — C1776 JOINT DEVICE (IMPLANTABLE): HCPCS | Performed by: ORTHOPAEDIC SURGERY

## 2024-03-06 PROCEDURE — 97110 THERAPEUTIC EXERCISES: CPT

## 2024-03-06 PROCEDURE — 97530 THERAPEUTIC ACTIVITIES: CPT

## 2024-03-06 PROCEDURE — 82948 REAGENT STRIP/BLOOD GLUCOSE: CPT

## 2024-03-06 PROCEDURE — 97166 OT EVAL MOD COMPLEX 45 MIN: CPT

## 2024-03-06 PROCEDURE — C9290 INJ, BUPIVACAINE LIPOSOME: HCPCS | Performed by: STUDENT IN AN ORGANIZED HEALTH CARE EDUCATION/TRAINING PROGRAM

## 2024-03-06 PROCEDURE — C1713 ANCHOR/SCREW BN/BN,TIS/BN: HCPCS | Performed by: ORTHOPAEDIC SURGERY

## 2024-03-06 DEVICE — SMARTSET HV HIGH VISCOSITY BONE CEMENT 40G
Type: IMPLANTABLE DEVICE | Site: KNEE | Status: FUNCTIONAL
Brand: SMARTSET

## 2024-03-06 DEVICE — ATTUNE KNEE SYSTEM FEMORAL POSTERIOR STABILIZED SIZE 4 RIGHT CEMENTED
Type: IMPLANTABLE DEVICE | Site: KNEE | Status: FUNCTIONAL
Brand: ATTUNE

## 2024-03-06 DEVICE — ATTUNE KNEE SYSTEM TIBIAL INSERT ROTATING PLATFORM POSTERIOR STABILIZED 4 12MM AOX
Type: IMPLANTABLE DEVICE | Site: KNEE | Status: FUNCTIONAL
Brand: ATTUNE

## 2024-03-06 DEVICE — ATTUNE KNEE SYSTEM TIBIAL BASE ROTATING PLATFORM SIZE 3 CEMENTED
Type: IMPLANTABLE DEVICE | Site: KNEE | Status: FUNCTIONAL
Brand: ATTUNE

## 2024-03-06 DEVICE — ATTUNE PATELLA MEDIALIZED DOME 38MM CEMENTED AOX
Type: IMPLANTABLE DEVICE | Site: KNEE | Status: FUNCTIONAL
Brand: ATTUNE

## 2024-03-06 RX ORDER — CALCIUM CARBONATE 500 MG/1
1000 TABLET, CHEWABLE ORAL DAILY PRN
Status: CANCELLED | OUTPATIENT
Start: 2024-03-06

## 2024-03-06 RX ORDER — HYDROMORPHONE HCL IN WATER/PF 6 MG/30 ML
0.2 PATIENT CONTROLLED ANALGESIA SYRINGE INTRAVENOUS
Status: DISCONTINUED | OUTPATIENT
Start: 2024-03-06 | End: 2024-03-06 | Stop reason: HOSPADM

## 2024-03-06 RX ORDER — BUPIVACAINE HYDROCHLORIDE 2.5 MG/ML
INJECTION, SOLUTION EPIDURAL; INFILTRATION; INTRACAUDAL AS NEEDED
Status: DISCONTINUED | OUTPATIENT
Start: 2024-03-06 | End: 2024-03-06

## 2024-03-06 RX ORDER — OXYCODONE HYDROCHLORIDE 5 MG/1
5 TABLET ORAL EVERY 4 HOURS PRN
Status: DISCONTINUED | OUTPATIENT
Start: 2024-03-06 | End: 2024-03-06 | Stop reason: HOSPADM

## 2024-03-06 RX ORDER — MAGNESIUM HYDROXIDE 1200 MG/15ML
LIQUID ORAL AS NEEDED
Status: DISCONTINUED | OUTPATIENT
Start: 2024-03-06 | End: 2024-03-06 | Stop reason: HOSPADM

## 2024-03-06 RX ORDER — METHOCARBAMOL 750 MG/1
750 TABLET, FILM COATED ORAL EVERY 6 HOURS SCHEDULED
Status: CANCELLED | OUTPATIENT
Start: 2024-03-06

## 2024-03-06 RX ORDER — OXYCODONE HYDROCHLORIDE 5 MG/1
10 TABLET ORAL EVERY 4 HOURS PRN
Status: DISCONTINUED | OUTPATIENT
Start: 2024-03-06 | End: 2024-03-06 | Stop reason: HOSPADM

## 2024-03-06 RX ORDER — SENNOSIDES 8.6 MG
1 TABLET ORAL DAILY
Status: CANCELLED | OUTPATIENT
Start: 2024-03-06

## 2024-03-06 RX ORDER — BUPIVACAINE HYDROCHLORIDE 7.5 MG/ML
INJECTION, SOLUTION INTRASPINAL AS NEEDED
Status: DISCONTINUED | OUTPATIENT
Start: 2024-03-06 | End: 2024-03-06

## 2024-03-06 RX ORDER — DOCUSATE SODIUM 100 MG/1
100 CAPSULE, LIQUID FILLED ORAL 2 TIMES DAILY
Qty: 10 CAPSULE | Refills: 0 | Status: SHIPPED | OUTPATIENT
Start: 2024-03-06

## 2024-03-06 RX ORDER — FOLIC ACID 1 MG/1
1 TABLET ORAL DAILY
Status: CANCELLED | OUTPATIENT
Start: 2024-03-06

## 2024-03-06 RX ORDER — BUPIVACAINE HYDROCHLORIDE AND EPINEPHRINE 2.5; 5 MG/ML; UG/ML
INJECTION, SOLUTION EPIDURAL; INFILTRATION; INTRACAUDAL; PERINEURAL AS NEEDED
Status: DISCONTINUED | OUTPATIENT
Start: 2024-03-06 | End: 2024-03-06 | Stop reason: HOSPADM

## 2024-03-06 RX ORDER — CHLORHEXIDINE GLUCONATE ORAL RINSE 1.2 MG/ML
15 SOLUTION DENTAL ONCE
Status: COMPLETED | OUTPATIENT
Start: 2024-03-06 | End: 2024-03-06

## 2024-03-06 RX ORDER — ONDANSETRON 2 MG/ML
4 INJECTION INTRAMUSCULAR; INTRAVENOUS ONCE AS NEEDED
Status: DISCONTINUED | OUTPATIENT
Start: 2024-03-06 | End: 2024-03-06 | Stop reason: HOSPADM

## 2024-03-06 RX ORDER — METOCLOPRAMIDE HYDROCHLORIDE 5 MG/ML
INJECTION INTRAMUSCULAR; INTRAVENOUS AS NEEDED
Status: DISCONTINUED | OUTPATIENT
Start: 2024-03-06 | End: 2024-03-06

## 2024-03-06 RX ORDER — ONDANSETRON 2 MG/ML
INJECTION INTRAMUSCULAR; INTRAVENOUS AS NEEDED
Status: DISCONTINUED | OUTPATIENT
Start: 2024-03-06 | End: 2024-03-06

## 2024-03-06 RX ORDER — LIDOCAINE HYDROCHLORIDE 10 MG/ML
INJECTION, SOLUTION EPIDURAL; INFILTRATION; INTRACAUDAL; PERINEURAL AS NEEDED
Status: DISCONTINUED | OUTPATIENT
Start: 2024-03-06 | End: 2024-03-06

## 2024-03-06 RX ORDER — PROPOFOL 10 MG/ML
INJECTION, EMULSION INTRAVENOUS CONTINUOUS PRN
Status: DISCONTINUED | OUTPATIENT
Start: 2024-03-06 | End: 2024-03-06

## 2024-03-06 RX ORDER — SODIUM CHLORIDE, SODIUM LACTATE, POTASSIUM CHLORIDE, CALCIUM CHLORIDE 600; 310; 30; 20 MG/100ML; MG/100ML; MG/100ML; MG/100ML
125 INJECTION, SOLUTION INTRAVENOUS CONTINUOUS
Status: DISCONTINUED | OUTPATIENT
Start: 2024-03-06 | End: 2024-03-06 | Stop reason: HOSPADM

## 2024-03-06 RX ORDER — ENOXAPARIN SODIUM 100 MG/ML
40 INJECTION SUBCUTANEOUS DAILY
Status: CANCELLED | OUTPATIENT
Start: 2024-03-06

## 2024-03-06 RX ORDER — AMLODIPINE BESYLATE 2.5 MG/1
2.5 TABLET ORAL
Status: CANCELLED | OUTPATIENT
Start: 2024-03-06

## 2024-03-06 RX ORDER — MORPHINE SULFATE 10 MG/ML
INJECTION, SOLUTION INTRAMUSCULAR; INTRAVENOUS AS NEEDED
Status: DISCONTINUED | OUTPATIENT
Start: 2024-03-06 | End: 2024-03-06 | Stop reason: HOSPADM

## 2024-03-06 RX ORDER — ONDANSETRON 2 MG/ML
4 INJECTION INTRAMUSCULAR; INTRAVENOUS EVERY 6 HOURS PRN
Status: CANCELLED | OUTPATIENT
Start: 2024-03-06

## 2024-03-06 RX ORDER — ACETAMINOPHEN 325 MG/1
650 TABLET ORAL EVERY 6 HOURS PRN
Status: CANCELLED | OUTPATIENT
Start: 2024-03-06

## 2024-03-06 RX ORDER — HYDROMORPHONE HCL/PF 1 MG/ML
0.5 SYRINGE (ML) INJECTION EVERY 2 HOUR PRN
Status: CANCELLED | OUTPATIENT
Start: 2024-03-06 | End: 2024-03-08

## 2024-03-06 RX ORDER — SODIUM CHLORIDE, SODIUM LACTATE, POTASSIUM CHLORIDE, CALCIUM CHLORIDE 600; 310; 30; 20 MG/100ML; MG/100ML; MG/100ML; MG/100ML
75 INJECTION, SOLUTION INTRAVENOUS CONTINUOUS
Status: DISCONTINUED | OUTPATIENT
Start: 2024-03-06 | End: 2024-03-06 | Stop reason: HOSPADM

## 2024-03-06 RX ORDER — DOCUSATE SODIUM 100 MG/1
100 CAPSULE, LIQUID FILLED ORAL 2 TIMES DAILY
Status: CANCELLED | OUTPATIENT
Start: 2024-03-06

## 2024-03-06 RX ORDER — TRANEXAMIC ACID 10 MG/ML
1000 INJECTION, SOLUTION INTRAVENOUS ONCE
Status: COMPLETED | OUTPATIENT
Start: 2024-03-06 | End: 2024-03-06

## 2024-03-06 RX ORDER — SENNOSIDES 8.6 MG
650 CAPSULE ORAL EVERY 8 HOURS PRN
Qty: 30 TABLET | Refills: 0 | Status: SHIPPED | OUTPATIENT
Start: 2024-03-06 | End: 2024-04-05

## 2024-03-06 RX ORDER — ATORVASTATIN CALCIUM 10 MG/1
10 TABLET, FILM COATED ORAL EVERY MORNING
Status: CANCELLED | OUTPATIENT
Start: 2024-03-06

## 2024-03-06 RX ORDER — TRANEXAMIC ACID 100 MG/ML
INJECTION, SOLUTION INTRAVENOUS AS NEEDED
Status: DISCONTINUED | OUTPATIENT
Start: 2024-03-06 | End: 2024-03-06 | Stop reason: HOSPADM

## 2024-03-06 RX ORDER — PROPOFOL 10 MG/ML
INJECTION, EMULSION INTRAVENOUS AS NEEDED
Status: DISCONTINUED | OUTPATIENT
Start: 2024-03-06 | End: 2024-03-06

## 2024-03-06 RX ORDER — ACETAMINOPHEN 325 MG/1
975 TABLET ORAL ONCE
Status: COMPLETED | OUTPATIENT
Start: 2024-03-06 | End: 2024-03-06

## 2024-03-06 RX ORDER — FENTANYL CITRATE 50 UG/ML
INJECTION, SOLUTION INTRAMUSCULAR; INTRAVENOUS AS NEEDED
Status: DISCONTINUED | OUTPATIENT
Start: 2024-03-06 | End: 2024-03-06

## 2024-03-06 RX ORDER — OXYCODONE HYDROCHLORIDE 5 MG/1
TABLET ORAL
Qty: 30 TABLET | Refills: 0 | Status: SHIPPED | OUTPATIENT
Start: 2024-03-06

## 2024-03-06 RX ORDER — TIMOLOL MALEATE 5 MG/ML
1 SOLUTION/ DROPS OPHTHALMIC DAILY
Status: CANCELLED | OUTPATIENT
Start: 2024-03-06

## 2024-03-06 RX ORDER — CEFAZOLIN SODIUM 2 G/50ML
2000 SOLUTION INTRAVENOUS ONCE
Status: COMPLETED | OUTPATIENT
Start: 2024-03-06 | End: 2024-03-06

## 2024-03-06 RX ORDER — CEFAZOLIN SODIUM 1 G/50ML
1000 SOLUTION INTRAVENOUS EVERY 8 HOURS
Status: CANCELLED | OUTPATIENT
Start: 2024-03-06 | End: 2024-03-07

## 2024-03-06 RX ORDER — FENTANYL CITRATE/PF 50 MCG/ML
25 SYRINGE (ML) INJECTION
Status: DISCONTINUED | OUTPATIENT
Start: 2024-03-06 | End: 2024-03-06 | Stop reason: HOSPADM

## 2024-03-06 RX ORDER — ONDANSETRON 4 MG/1
4 TABLET, FILM COATED ORAL EVERY 8 HOURS PRN
Qty: 5 TABLET | Refills: 0 | Status: SHIPPED | OUTPATIENT
Start: 2024-03-06

## 2024-03-06 RX ORDER — METOPROLOL SUCCINATE 25 MG/1
25 TABLET, EXTENDED RELEASE ORAL
Status: CANCELLED | OUTPATIENT
Start: 2024-03-06

## 2024-03-06 RX ORDER — ASCORBIC ACID 500 MG
500 TABLET ORAL 2 TIMES DAILY
Status: CANCELLED | OUTPATIENT
Start: 2024-03-06

## 2024-03-06 RX ORDER — ALBUTEROL SULFATE 2.5 MG/3ML
2.5 SOLUTION RESPIRATORY (INHALATION) ONCE AS NEEDED
Status: DISCONTINUED | OUTPATIENT
Start: 2024-03-06 | End: 2024-03-06 | Stop reason: HOSPADM

## 2024-03-06 RX ORDER — KETOROLAC TROMETHAMINE 30 MG/ML
INJECTION, SOLUTION INTRAMUSCULAR; INTRAVENOUS AS NEEDED
Status: DISCONTINUED | OUTPATIENT
Start: 2024-03-06 | End: 2024-03-06 | Stop reason: HOSPADM

## 2024-03-06 RX ORDER — CHLORHEXIDINE GLUCONATE 4 G/100ML
SOLUTION TOPICAL DAILY PRN
Status: DISCONTINUED | OUTPATIENT
Start: 2024-03-06 | End: 2024-03-06 | Stop reason: HOSPADM

## 2024-03-06 RX ADMIN — BUPIVACAINE HYDROCHLORIDE IN DEXTROSE 1.2 ML: 7.5 INJECTION, SOLUTION SUBARACHNOID at 08:40

## 2024-03-06 RX ADMIN — PHENYLEPHRINE HYDROCHLORIDE 20 MCG/MIN: 10 INJECTION INTRAVENOUS at 08:43

## 2024-03-06 RX ADMIN — BUPIVACAINE 20 ML: 13.3 INJECTION, SUSPENSION, LIPOSOMAL INFILTRATION at 08:15

## 2024-03-06 RX ADMIN — CHLORHEXIDINE GLUCONATE 0.12% ORAL RINSE 15 ML: 1.2 LIQUID ORAL at 08:06

## 2024-03-06 RX ADMIN — ACETAMINOPHEN 975 MG: 325 TABLET, FILM COATED ORAL at 07:52

## 2024-03-06 RX ADMIN — PROPOFOL 40 MG: 10 INJECTION, EMULSION INTRAVENOUS at 08:43

## 2024-03-06 RX ADMIN — PROPOFOL 80 MCG/KG/MIN: 10 INJECTION, EMULSION INTRAVENOUS at 08:43

## 2024-03-06 RX ADMIN — LIDOCAINE HYDROCHLORIDE 5 ML: 10 INJECTION, SOLUTION EPIDURAL; INFILTRATION; INTRACAUDAL; PERINEURAL at 08:43

## 2024-03-06 RX ADMIN — SODIUM CHLORIDE, SODIUM LACTATE, POTASSIUM CHLORIDE, AND CALCIUM CHLORIDE: .6; .31; .03; .02 INJECTION, SOLUTION INTRAVENOUS at 10:07

## 2024-03-06 RX ADMIN — FENTANYL CITRATE 25 MCG: 50 INJECTION INTRAMUSCULAR; INTRAVENOUS at 08:42

## 2024-03-06 RX ADMIN — FENTANYL CITRATE 50 MCG: 50 INJECTION INTRAMUSCULAR; INTRAVENOUS at 08:12

## 2024-03-06 RX ADMIN — CEFAZOLIN SODIUM 2000 MG: 2 SOLUTION INTRAVENOUS at 08:36

## 2024-03-06 RX ADMIN — METOCLOPRAMIDE 10 MG: 5 INJECTION, SOLUTION INTRAMUSCULAR; INTRAVENOUS at 08:54

## 2024-03-06 RX ADMIN — TRANEXAMIC ACID 1000 MG: 10 INJECTION, SOLUTION INTRAVENOUS at 08:41

## 2024-03-06 RX ADMIN — BUPIVACAINE HYDROCHLORIDE 20 ML: 2.5 INJECTION, SOLUTION EPIDURAL; INFILTRATION; INTRACAUDAL; PERINEURAL at 08:20

## 2024-03-06 RX ADMIN — OXYCODONE HYDROCHLORIDE 5 MG: 5 TABLET ORAL at 12:57

## 2024-03-06 RX ADMIN — FENTANYL CITRATE 25 MCG: 50 INJECTION INTRAMUSCULAR; INTRAVENOUS at 10:10

## 2024-03-06 RX ADMIN — ONDANSETRON 4 MG: 2 INJECTION INTRAMUSCULAR; INTRAVENOUS at 08:54

## 2024-03-06 RX ADMIN — SODIUM CHLORIDE, SODIUM LACTATE, POTASSIUM CHLORIDE, AND CALCIUM CHLORIDE: .6; .31; .03; .02 INJECTION, SOLUTION INTRAVENOUS at 08:13

## 2024-03-06 NOTE — OCCUPATIONAL THERAPY NOTE
Occupational Therapy Evaluation     Patient Name: Jada Lanier  Today's Date: 3/6/2024  Problem List  Principal Problem:    Status post total knee replacement using cement, right    Past Medical History  Past Medical History:   Diagnosis Date    Anemia     Anxiety     Colon polyp     Depression     Diabetes mellitus (HCC)     Fibroid     3.2    GERD (gastroesophageal reflux disease)     Hyperlipidemia     Hypertension     Rheumatoid arthritis (HCC)     rt knee, lt ankle     Past Surgical History  Past Surgical History:   Procedure Laterality Date    BUNIONECTOMY Left     CATARACT EXTRACTION Right     COLONOSCOPY  2013, 2017    DILATION AND CURETTAGE OF UTERUS      ORIF ANKLE FRACTURE Left     STEROID INJECTION KNEE Right     TONSILLECTOMY      TUBAL LIGATION          03/06/24 1405   OT Last Visit   OT Visit Date 03/06/24   Note Type   Note type Evaluation   Pain Assessment   Pain Assessment Tool 0-10   Pain Score 5   Pain Location/Orientation Orientation: Right;Location: Knee   Pain Onset/Description Descriptor: Aching   Patient's Stated Pain Goal No pain   Hospital Pain Intervention(s) Repositioned;Ambulation/increased activity   Multiple Pain Sites No   Home Living   Type of Home House  (bi-level)   Home Layout Two level;Performs ADLs on one level;Able to live on main level with bedroom/bathroom;Stairs to enter with rails   Bathroom Shower/Tub Tub/shower unit   Bathroom Toilet Standard   Bathroom Equipment Shower chair;Commode   Bathroom Accessibility Accessible   Home Equipment Walker;Cane;Reacher;Sock aid   Prior Function   Level of [place] Independent with ADLs;Independent with functional mobility;Independent with IADLS  (friend takes groceries into house)   Lives With Alone   Receives Help From Family;Friend(s)   IADLs Independent with driving;Independent with meal prep;Independent with medication management   Falls in the last 6 months 0   Vocational Retired   Lifestyle   Autonomy lives alone in  "bi-level home [address], 5 steps to main living area   Reciprocal Relationships has supportive family and friend   General   Family/Caregiver Present Yes   Subjective   Subjective \"I can do this\"   ADL   Where Assessed Edge of bed   Equipment Provided Reacher;Sock aid;Dressing stick   Eating Assistance 7  Independent   Grooming Assistance 7  Independent   UB Dressing Assistance 7  Independent   LB Dressing Assistance 4  Minimal Assistance   LB Dressing Deficit Don/doff R sock;Don/doff L sock;Don/doff R shoe;Don/doff L shoe;Use of adaptive equipment   Toileting Assistance  5  Supervision/Setup   Toileting Deficit Bedside commode;Grab bar use;Increased time to complete;Supervision/safety   Bed Mobility   Supine to Sit 5  Supervision   Additional items Assist x 1   Transfers   Sit to Stand 5  Supervision   Additional items Assist x 1;Verbal cues   Stand to Sit 5  Supervision   Additional items Assist x 1;Verbal cues   Tub transfer 5  Supervision   Additional items Assist x 1;Verbal cues   Activity Tolerance   Activity Tolerance Patient limited by pain   Nurse Made Aware nursing present and aware of pt performance   RUE Assessment   RUE Assessment WFL   LUE Assessment   LUE Assessment WFL   Psychosocial   Psychosocial (WDL) WDL   Cognition   Overall Cognitive Status WFL   Arousal/Participation Alert;Cooperative   Attention Within functional limits   Orientation Level Oriented X4   Memory Within functional limits   Following Commands Follows all commands and directions without difficulty   Assessment   Limitation Decreased ADL status;Decreased high-level ADLs   Prognosis Good   Assessment Pt seen today for OT IE POD#0 R TKA. Pt was living alone in a bi-level home at Indep  level for I/ADls, friend assists with taking groceries into home, Indep ambulation W/SPC + prior to admission. Pt presents today with decreased ROM R knee, mild pain and balance deficits. She was able to complete bed mobility at Sup level, sit<>stand " and functional ambulation with RW at Sup level. UB dressing completes at Indep level, LB dressing with skirt completed with Bernard. Pt supplied ed on use of LHAE for LB ADLs with  improvement to Leo level. Pt cleared for D/C home with assist when medically able. No further OT needed.   Plan   Treatment Interventions ADL retraining;Functional transfer training;UE strengthening/ROM;Endurance training;Patient/family training;Equipment evaluation/education;Compensatory technique education   OT Treatment Day 0   OT Frequency Eval only   Discharge Recommendation   Rehab Resource Intensity Level, OT No post-acute rehabilitation needs   AM-PAC Daily Activity Inpatient   Lower Body Dressing 3   Bathing 3   Toileting 4   Upper Body Dressing 4   Grooming 4   Eating 4   Daily Activity Raw Score 22   Daily Activity Standardized Score (Calc for Raw Score >=11) 47.1   AM-PAC Applied Cognition Inpatient   Following a Speech/Presentation 4   Understanding Ordinary Conversation 4   Taking Medications 4   Remembering Where Things Are Placed or Put Away 4   Remembering List of 4-5 Errands 4   Taking Care of Complicated Tasks 4   Applied Cognition Raw Score 24   Applied Cognition Standardized Score 62.21   End of Consult   Education Provided Yes;Family or social support of family present for education by provider   Patient Position at End of Consult Seated edge of bed   Nurse Communication Nurse aware of consult

## 2024-03-06 NOTE — PLAN OF CARE
Problem: PHYSICAL THERAPY ADULT  Goal: Performs mobility at highest level of function for planned discharge setting.  See evaluation for individualized goals.  Description: Treatment/Interventions: Functional transfer training, LE strengthening/ROM, Elevations, Therapeutic exercise, Patient/family training, Equipment eval/education, Bed mobility, Gait training          See flowsheet documentation for full assessment, interventions and recommendations.  Outcome: Progressing  Note: Prognosis: Good  Problem List: Decreased strength, Decreased range of motion, Impaired balance, Decreased mobility, Pain, Orthopedic restrictions  Assessment: Orders for PT eval and treat received. Pt's PMHx: anxiety, RA of ankles, ORIF left ankle  Pt exhibits physical deficits noted in problem list above.  Deficits listed contribute to functional limitations that are significant from the patient PLOF and include: impaired standing balance, inability to perform safe transfers, tolerance for OOB functional activities, unsafe/inefficient ambulation, fall risk, and unable to safely manage stairs/steps/ramp    Additionally, patient is limited by restrictions/precautions/DME: RLE wBAT.    During today's session, initiated HEP in supine and standing.  Adjusted walker and provided walker training.  Educated and instructed on bed mobility, transfers, gait and stair training.  Pt exhibited good learning ability, good carry over, and good response to activity.    The AM-PAC & Barthel Index outcome tools were used to assist in determining pt safety w/ mobility/self care & appropriate d/c recommendations, see above for scores. Patient's clinical presentation is evolving due to recent surgery/procedure and ongoing medical management needs.     Considering the patient's PLOF, co-morbidities, acute functional limitations, functional outcome measures, and/or goal to progress functional independence; this patient would benefit from skilled Physical  Therapy intervention in the acute care setting.  Barriers to Discharge: None     Rehab Resource Intensity Level, PT: III (Minimum Resource Intensity)    See flowsheet documentation for full assessment.

## 2024-03-06 NOTE — PROGRESS NOTES
REIDCM reviewed pt chart and pt is in the hospital today for arthoplasty of right knee. [facility] will remain available to continue to support pt.

## 2024-03-06 NOTE — OP NOTE
Op Note  Jada Lanier  MRN: [id number]      Unit/Bed#:  OR MAIN    PreOp Dx: right knee DJD      Postop Dx: right knee DJD      Procedure: right total knee arthroplasty      Surgeon: Valentina Montes MD      Assistants:Mitchel Rizzo PA-C     No Qualified Resident Available for this Case. The PA was needed for all portions of this case including prepping and draping the patient as well as prepping and final implantation of the femoral, tibial, and patella components    Fluids:       EBL:       Drains: none      Specimens: No       Complications: No       Condition: stable transferred to postanesthesia care unit      Implants: Depuy Attune RP  Femoral, size: 4  Tibial tray: 3  Polyethylene: 12  Patellar button: 38      78 y.o.female, presents at this time, secondary to treatment of right knee DJD with varus deformity and flexion contracture of about 15-20 degrees, which has failed conservative treatment.    The patient was told of all the pros and cons of operative and nonoperative intervention. Some of the complications of operative intervention include infection, bleeding, scarring, nerve injury, vascular injury, fracture, continued pain, decreased range of motion, DVT, PE death, loss of limb, need for further surgery, not obtain an results. The patient wished. Patient did consent for operative intervention for this pathology.    Patient was brought to the operating room. Patient was anesthetized as anesthesia team. Patient was prepped and draped in normal sterile fashion. After this was done, we did conduct a time out to make sure correct. Patient was in the room, prepped marked and draped. Implants were in the room, Rep. For the implants were present, DVT prophylaxis and antibiotics were addressed.    Midline incision was made over the anterior knee after going through skin, fatty tissue, fascia was identified. Flaps were created both medially and laterally. Medial parapatellar incision was made.  Excision of Hoffa fat pad was conducted. At this time because this was a varus knee, we did release in order to expose the medial and lateral tibial plateaus. We're able to excise the fatty tissue from the anterior aspect of the distal femur. We were able to at this time, flex the knee with the patella everted. Intramedullary reamer was used. Intramedullary guide for the femur was then placed to determine how much of the distal femur to cut and also, valgus cut angle. Pins were then placed. Intramedullary guide was removed. Distal femoral cut was made.  Attention was now to the proximal tibia. Extramedullary guide was used. After making sure that we took the appropriate amount from the medial and lateral side with the aid of a measuring device, the jig was held in place with pins. Protection of the medial and lateral ligaments, as well as the popliteal region, was done. Proximal tibial cut was made. Extension gaps were evaluated.  Size of the femur was then determined with the aid of a guide. After this was done, we placed the appropriate sized block. These were held down with pins. Anterior and posterior cuts were made. Anterior, posterior, chamfer cuts were made. We did check our flexion gap and was noted to be appropriate. This was a PCL sacrificing device being used Therefore a box cut was made.  Tibial tray size was determined. This was held down with 2 small screws. The reamer and the punch was then used with the appropriate guide to make sure that these were all done, the appropriate manner. Guide was removed. Trial femoral component was placed. Trial tibial polyethylene was placed. Patient was noted to be stable. On coronal and sagittal planes.   Patellar button size was determined after the articular surface of the patella was excised. The patella button was placed on the medial aspect of the patella. Holes were drilled for our true patella button to be cemented on. We tracked the knee, and we noted that  the patella was tracking appropriately over the trochlear of the trial implants. After this was done we did drill holes in our trial femoral component. We then removed. All trial components.  Copious irrigation was used at the operative site. We did place some bone within the intramedullary canal of the femur. High viscocity cement was used and all components were placed, including the femur, tibia, and patella button. A trial tibial Polyethylene was placed. After cement had dried, removed the trial polyethylene and removed any excess cement that was in the popliteal region. Copious irrigation was used. The tibial polyethylene was then placed. Patient was placed in the appropriate ranges of motion and patient's Knee was noted to be stable.  Tourniquet was discontinued. Hemostasis was obtained. #1 Vicryl sutures were used to reapproximate the parapatellar incision. 2-0 Vicryl sutures for the subcutaneous closure. This was reinforced with staples. Wounds were cleaned and dried. Acticoat, 4 x 4, ABDs and web roll was placed prior to Ace bandage be placed from the foot. All the way to the mid thigh region.  Patient was awakened as anesthesia team noted to be a stable condition and transferred to postanesthesia care unit

## 2024-03-06 NOTE — ANESTHESIA POSTPROCEDURE EVALUATION
Post-Op Assessment Note    CV Status:  Stable  Pain Score: 0    Pain management: adequate    Multimodal analgesia used between 6 hours prior to anesthesia start to PACU discharge    Mental Status:  Arousable   Hydration Status:  Euvolemic   PONV Controlled:  Controlled   Airway Patency:  Patent     Post Op Vitals Reviewed: Yes    No anethesia notable event occurred.    Staff: CRNA               BP   100/55   Temp      Pulse 70   Resp 14   SpO2 98

## 2024-03-06 NOTE — DISCHARGE INSTR - AVS FIRST PAGE
Discharge Instructions - Orthopedics  Jada Lanier 78 y.o. female MRN: [id number]  Unit/Bed#: [facility] OR MAIN    Weight Bearing Status:                                           Weight Bearing as tolerated to the right lower extremity.    DVT prophylaxis:  Complete course of Aspirin 81 mg twice daily x 35 days from date of surgery as directed    Pain:  Start oxycodone 5 mg every 6 hrs after last dose taken after surgery for 1 day  Transition to oxycodone 5 mg every 6 hours as needed    Showering Instructions:   Do not shower until 5 days from date of surgery  Do not soak your incision(Tubs, Jacuzzi's, pools, ext)    Dressing Instructions:   May remove dressing 5 days from date of surgery or may leave dressing in place until follow up office visit 2 weeks from surgery date  Keep dressing/incision clean, dry and intact until follow up appointment.  Do not apply any creams or ointments/lotions to your incisions including antibiotic ointment, peroxide    Driving Instructions:  No driving until cleared by Orthopaedic Surgery.    PT/OT:  Continue PT/OT on outpatient basis as directed  Continue motion and strengthening exercises while at home    Appt Instructions:   If you do not have your appointment, please call the clinic at [phone]  Otherwise followup as scheduled    Contact the office sooner if you experience any increased numbness/tingling in the extremities.

## 2024-03-06 NOTE — ANESTHESIA PROCEDURE NOTES
Spinal Block    Patient location during procedure: OR  Start time: 3/6/2024 8:40 AM  Reason for block: procedure for pain, at surgeon's request and primary anesthetic  Staffing  Performed by: Ezio Lizarraga CRNA  Authorized by: Gonzalo Marie MD    Preanesthetic Checklist  Completed: patient identified, IV checked, site marked, risks and benefits discussed, surgical consent, monitors and equipment checked, pre-op evaluation and timeout performed  Spinal Block  Patient position: sitting  Prep: ChloraPrep  Patient monitoring: cardiac monitor, frequent blood pressure checks, heart rate and continuous pulse ox  Approach: midline  Location: L3-4  Injection technique: single-shot  Needle  Needle type: pencil-tip   Needle gauge: 25 G  Needle length: 10 cm  Assessment  Sensory level: T10  Injection Assessment:  negative aspiration for heme, no paresthesia on injection and positive aspiration for clear CSF.  Post-procedure:  site cleaned

## 2024-03-06 NOTE — ANESTHESIA PREPROCEDURE EVALUATION
Procedure:  ARTHROPLASTY KNEE TOTAL and all associated procedures (Right: Knee)    Relevant Problems   CARDIO   (+) Essential hypertension      ENDO   (+) Type 2 diabetes mellitus without complication, without long-term current use of insulin (HCC)      HEMATOLOGY   (+) Chronic anemia      MUSCULOSKELETAL   (+) Osteoarthritis of right knee        Physical Exam    Airway    Mallampati score: II  TM Distance: >3 FB  Neck ROM: full     Dental       Cardiovascular      Pulmonary      Other Findings  post-pubertal.      Anesthesia Plan  ASA Score- 2     Anesthesia Type- spinal with ASA Monitors.         Additional Monitors:     Airway Plan:     Comment: Normal Plts and no AC..       Plan Factors-Exercise tolerance (METS): >4 METS.    Chart reviewed.                      Induction- intravenous.    Postoperative Plan-     Informed Consent- Anesthetic plan and risks discussed with patient.  I personally reviewed this patient with the CRNA. Discussed and agreed on the Anesthesia Plan with the CRNA..            Anesthesia plan and consent discussed with Jada who expressed understanding and agreement. Risks/benefits and alternatives discussed with patient including possible PONV, sore throat, damage to teeth/lips/gums and possibility of rare anesthetic and surgical emergencies.

## 2024-03-06 NOTE — INTERVAL H&P NOTE
H&P reviewed. After examining the patient I find no changes in the patients condition since the H&P had been written.    Vitals:    03/06/24 0748   BP: (!) 173/84   Pulse: 66   Resp: 14   Temp: 97.5 °F (36.4 °C)   SpO2: 99%   Patient seen and examined  Gen: no apparent distress  CV: S1S2  Chest: no audible wheezing  Abdomen: soft  Right LE: no open wounds or abrasions, mild effusion, distally NVI  To the OR for Right TKA   Pros and cons of op and non-op intervention discussed with patient   We will follow up postoperatively

## 2024-03-06 NOTE — ANESTHESIA PROCEDURE NOTES
Peripheral Block    Patient location during procedure: holding area  Start time: 3/6/2024 8:20 AM  Reason for block: at surgeon's request and post-op pain management  Staffing  Performed by: Gonzalo Marie MD  Authorized by: Gonzalo Marie MD    Preanesthetic Checklist  Completed: patient identified, IV checked, site marked, risks and benefits discussed, surgical consent, monitors and equipment checked, pre-op evaluation and timeout performed  Peripheral Block  Patient position: supine  Prep: ChloraPrep  Patient monitoring: frequent blood pressure checks, continuous pulse oximetry and heart rate  Block type: IPACK  Laterality: right  Injection technique: single-shot  Procedures: ultrasound guided, Ultrasound guidance required for the procedure to increase accuracy and safety of medication placement and decrease risk of complications.  Ultrasound permanent image saved  Needle  Needle type: Stimuplex   Needle length: 4 in  Needle localization: anatomical landmarks and ultrasound guidance  Assessment  Injection assessment: incremental injection, frequent aspiration, injected with ease, negative aspiration, negative for heart rate change, no paresthesia on injection, no symptoms of intraneural/intravenous injection and needle tip visualized at all times  Paresthesia pain: none  Post-procedure:  site cleaned  patient tolerated the procedure well with no immediate complications  Additional Notes  Uncomplicated single shot right IPACK block

## 2024-03-06 NOTE — ANESTHESIA PROCEDURE NOTES
Peripheral Block    Patient location during procedure: holding area  Start time: 3/6/2024 8:20 AM  Reason for block: at surgeon's request and post-op pain management  Staffing  Performed by: Gonzalo Marie MD  Authorized by: Gonzalo Marie MD    Preanesthetic Checklist  Completed: patient identified, IV checked, site marked, risks and benefits discussed, surgical consent, monitors and equipment checked, pre-op evaluation and timeout performed  Peripheral Block  Patient position: supine  Prep: ChloraPrep  Patient monitoring: frequent blood pressure checks, continuous pulse oximetry and heart rate  Block type: Adductor Canal  Laterality: right  Injection technique: single-shot  Procedures: ultrasound guided, Ultrasound guidance required for the procedure to increase accuracy and safety of medication placement and decrease risk of complications.  Ultrasound permanent image saved  Needle  Needle type: Stimuplex   Needle length: 4 in  Needle localization: anatomical landmarks and ultrasound guidance  Assessment  Injection assessment: incremental injection, frequent aspiration, injected with ease, negative aspiration, negative for heart rate change, no paresthesia on injection, no symptoms of intraneural/intravenous injection and needle tip visualized at all times  Paresthesia pain: none  Post-procedure:  site cleaned  patient tolerated the procedure well with no immediate complications  Additional Notes  Uncomplicated right adductor canal block

## 2024-03-06 NOTE — PHYSICAL THERAPY NOTE
PHYSICAL THERAPY Evaluation and Treatment  DATE: 03/06/24  TIME: 1623-1295 and 2888-0625    NAME:  Jada Lanier  AGE:   78 y.o.  Mrn:   [id number]  Length Of Stay: 0    ADMIT DX:  Primary osteoarthritis of right knee [M17.11]    Past Medical History:   Diagnosis Date    Anemia     Anxiety     Colon polyp     Depression     Diabetes mellitus (HCC)     Fibroid     3.2    GERD (gastroesophageal reflux disease)     Hyperlipidemia     Hypertension     Rheumatoid arthritis (HCC)     rt knee, lt ankle     Past Surgical History:   Procedure Laterality Date    BUNIONECTOMY Left     CATARACT EXTRACTION Right     COLONOSCOPY  2013, 2017    DILATION AND CURETTAGE OF UTERUS      ORIF ANKLE FRACTURE Left     STEROID INJECTION KNEE Right     TONSILLECTOMY      TUBAL LIGATION          03/06/24 1406   PT Last Visit   PT Visit Date 03/06/24   Note Type   Note type Evaluation   Pain Assessment   Pain Assessment Tool 0-10   Pain Score 5   Pain Location/Orientation Orientation: Right;Location: Knee   Hospital Pain Intervention(s) Repositioned;Ambulation/increased activity   Restrictions/Precautions   Weight Bearing Precautions Per Order Yes   RLE Weight Bearing Per Order WBAT   Other Precautions WBS;Pain;Fall Risk   Home Living   Additional Comments Pt lives alone (although intends to have niece and friend available during the days) in multi-level house, 7 steps to enter.  Bed/bathroom upstairs: 7 step to access, flat bed, shower/tub.  DME: SPC, BSC, RW   Prior Function   Comments Prior to admission: independent with ADLs, IADLs, drive (+), Pt ambulates with SPC at baseline   General   Additional Pertinent History 79 y/o presents for elective right TKA.   Family/Caregiver Present Yes   Cognition   Overall Cognitive Status WFL   Arousal/Participation Alert   Orientation Level Oriented X4   Subjective   Subjective Pt pleasant and agreeable to participate.  Pt reporting pain prior to session, but pain improved with mobility   RUE  Assessment   RUE Assessment WFL   LUE Assessment   LUE Assessment WFL   RLE Assessment   RLE Assessment   (knee AROM ~10-50 deg, all other WFL)   LLE Assessment   LLE Assessment WFL   Coordination   Movements are Fluid and Coordinated 1   Sensation WFL   Bed Mobility   Supine to Sit 5  Supervision   Additional items Assist x 1;HOB elevated;Bedrails;Increased time required;Verbal cues   Transfers   Sit to Stand 5  Supervision   Additional items Assist x 1;Bedrails;Increased time required;Verbal cues   Stand to Sit 5  Supervision   Additional items Assist x 1;Bedrails;Increased time required;Verbal cues   Ambulation/Elevation   Gait Assistance 5  Supervision   Additional items Assist x 1;Verbal cues;Tactile cues   Assistive Device Rolling walker   Distance 40'x2+80'   Stair Management Assistance 4  Minimal assist   Additional items Assist x 1;Verbal cues;Tactile cues   Stair Management Technique One rail R;One rail L;With walker   Number of Stairs 9   Ambulation/Elevation Additional Comments pt quickly progressed step-to gait to step-through gait.  with cues she was able to improve knee flex and foot clearance with swing phase.  Pt does struggle to gain terminal knee extension at this time.  good walker management, pace, and balance.  Pt ascend/descended 4 steps with left hand rail/cane, 4 steps with both hands on right hand rail moving laterally, and up/down curb step with RW.  pt performs safely with CGA and cues for safety/technique   Balance   Static Sitting Good   Dynamic Sitting Fair +   Static Standing Fair +   Dynamic Standing Fair +   Ambulatory Fair +  (RW)   Endurance Deficit   Endurance Deficit No   Activity Tolerance   Activity Tolerance Patient limited by pain   Medical Staff Made Aware collaborated with OT   Nurse Made Aware nursing present and aware of pt performance   Assessment   Prognosis Good   Problem List Decreased strength;Decreased range of motion;Impaired balance;Decreased  mobility;Pain;Orthopedic restrictions   Assessment Orders for PT eval and treat received. Pt's PMHx: anxiety, RA of ankles, ORIF left ankle  Pt exhibits physical deficits noted in problem list above.  Deficits listed contribute to functional limitations that are significant from the patient PLOF and include: impaired standing balance, inability to perform safe transfers, tolerance for OOB functional activities, unsafe/inefficient ambulation, fall risk, and unable to safely manage stairs/steps/ramp    Additionally, patient is limited by restrictions/precautions/DME: RLE wBAT.    During today's session, initiated HEP in supine and standing.  Adjusted walker and provided walker training.  Educated and instructed on bed mobility, transfers, gait and stair training.  Pt exhibited good learning ability, good carry over, and good response to activity.    The AM-PAC & Barthel Index outcome tools were used to assist in determining pt safety w/ mobility/self care & appropriate d/c recommendations, see above for scores. Patient's clinical presentation is evolving due to recent surgery/procedure and ongoing medical management needs.     Considering the patient's PLOF, co-morbidities, acute functional limitations, functional outcome measures, and/or goal to progress functional independence; this patient would benefit from skilled Physical Therapy intervention in the acute care setting.   Barriers to Discharge None   Goals   Patient Goals return to PLOF   STG Expiration Date 02/25/24   Short Term Goal #1 1: Pt will perform supine<>sit transfer on flat bed, mod I.  2: Pt will perform stand pivot transfer with RW, mod I.  3: Pt will ambulate 150' with reciprocal gait and appropriate pace using RW, mod I. 4: Pt will perform written HEP, mod I. 5: Pt will ascend/descend a curb step and/or stairs require to mobilize around the the patient's home with RW/rails, Sup A.   Plan   Treatment/Interventions Functional transfer training;LE  strengthening/ROM;Elevations;Therapeutic exercise;Patient/family training;Equipment eval/education;Bed mobility;Gait training   PT Frequency Twice a day   Discharge Recommendation   Rehab Resource Intensity Level, PT III (Minimum Resource Intensity)   AM-PAC Basic Mobility Inpatient   Turning in Flat Bed Without Bedrails 3   Lying on Back to Sitting on Edge of Flat Bed Without Bedrails 3   Moving Bed to Chair 3   Standing Up From Chair Using Arms 3   Walk in Room 3   Climb 3-5 Stairs With Railing 3   Basic Mobility Inpatient Raw Score 18   Basic Mobility Standardized Score 41.05   Highest Level Of Mobility   JH-HLM Goal 6: Walk 10 steps or more   JH-HLM Achieved 7: Walk 25 feet or more   Barthel Index   Feeding 10   Bathing 0   Grooming Score 5   Dressing Score 5   Bladder Score 10   Bowels Score 10   Toilet Use Score 10   Transfers (Bed/Chair) Score 10   Mobility (Level Surface) Score 10   Stairs Score 5   Barthel Index Score 75   Additional Treatment Session   Start Time   (1425 and 1443)   End Time   (1434 and 1504)   Treatment Assessment Discussed at length about pain management strategies, postural corrections, proper use of RW, gait/stair training corrections, importance of gaining AROM, benefit of frequent tolerable physical activity/exercise, and fall risk precautions.  Provided caregiver training and tips as appropriate and needed. Pt responded well to exercises exhibiting improved ROM, strength, and pain management.  Pt exhibited good progression of safe transfers and gait training with RW.   Exercises   Quad Sets Supine;10 reps;AROM;Right  (5 sec hold)   Heelslides Supine;15 reps;AAROM;Right   Marching Standing;10 reps;AROM;Right;Left   Balance training  standing at RW: lateral weight shifting 10x, corine heel raises 10x.   End of Consult   Patient Position at End of Consult Seated edge of bed;All needs within reach  (OT present to take over patient care.)   The patient's AM-PAC Basic Mobility Inpatient  Short Form Raw Score is 18. A Raw score of greater than or equal to 16 suggests the patient may benefit from discharge to home. Please also refer to the recommendation of the Physical Therapist for safe discharge planning.    Sae Najera PT, DPT  [place] Licensure #[id number]

## 2024-03-07 ENCOUNTER — TELEPHONE (OUTPATIENT)
Dept: OBGYN CLINIC | Facility: HOSPITAL | Age: 79
End: 2024-03-07

## 2024-03-07 DIAGNOSIS — M17.11 PRIMARY OSTEOARTHRITIS OF RIGHT KNEE: ICD-10-CM

## 2024-03-07 NOTE — TELEPHONE ENCOUNTER
Patient contacted for a postoperative follow up assessment. Patient states current pain level of a  5/10  when sitting and 5/10 when walking with RW. Patient states they have minimal pain and it is relieved with medication regimen. Patient denies increase in swelling and dressing is clean, dry and intact. Patient is icing the site regularly.     We reviewed patients AVS medication list. Patient is taking Oxycodone 5mg PRN, ASA 81mg BID, Colace 100mg BID. Patient has not yet had a BM but is passing gas.      Patient denies nausea, vomiting, abdominal pain, chest pain, shortness of breath, fever, dizziness and calf pain. Patient confirmed post-op appointment with surgeon on 3/22 at 11AM.Patient does not have any other questions or concerns at this time. Pt was encouraged to call with any questions, concerns or issues.

## 2024-03-07 NOTE — TELEPHONE ENCOUNTER
Patient had a knee replacement done 3/6/24 and is not sure whether or not she is to continue to take these medications after the surgery. If so she needs refills.     Reason for call:   [x] Refill   [] Prior Auth  [] Other:     Office:   [] PCP/Provider -   [x] Specialty/Provider - ortho / [facility]    Medication:   ascorbic acid (VITAMIN C) 500 MG tablet   ferrous sulfate 324 (65 Fe) mg   folic acid (FOLVITE) 1 mg tablet     Dose/Frequency:   500 mg, Oral, 2 times daily   324 mg, Oral, 2 times daily before meals   1 mg, Oral, Daily     Quantity:   60  60  30    Pharmacy:  [id number] [place] [place] [facility] [address]     Does the patient have enough for 3 days?   [] Yes   [x] No - Send as HP to POD

## 2024-03-08 ENCOUNTER — TELEPHONE (OUTPATIENT)
Age: 79
End: 2024-03-08

## 2024-03-08 RX ORDER — ASCORBIC ACID 500 MG
500 TABLET ORAL 2 TIMES DAILY
Qty: 60 TABLET | Refills: 0 | OUTPATIENT
Start: 2024-03-08

## 2024-03-08 RX ORDER — FERROUS SULFATE 324(65)MG
324 TABLET, DELAYED RELEASE (ENTERIC COATED) ORAL
Qty: 60 TABLET | Refills: 0 | OUTPATIENT
Start: 2024-03-08

## 2024-03-08 RX ORDER — FOLIC ACID 1 MG/1
1 TABLET ORAL DAILY
Qty: 30 TABLET | Refills: 0 | OUTPATIENT
Start: 2024-03-08

## 2024-03-08 NOTE — TELEPHONE ENCOUNTER
Caller: Jada     Doctor/Office: Annalee sanchez    CB#: [phone]    What needs to be faxed: [facility] needs a PT order     ATTN to: LUKASZ    Fax#: [phone]    Caller: Jada    Reason for call: Also wanted to let office now that she is having some warmth on her upper thigh area and also some numbness    Call back#: [phone]

## 2024-03-11 NOTE — TELEPHONE ENCOUNTER
Caller: Annabel Panchal    Doctor: Simon    Reason for call: Calling for PT order from 3/6 to be refaxed to them due to receiving the wrong script.    Patient has PT today.    Fax # [phone]    Call back#: [phone]

## 2024-03-13 ENCOUNTER — PATIENT OUTREACH (OUTPATIENT)
Dept: OBGYN CLINIC | Facility: HOSPITAL | Age: 79
End: 2024-03-13

## 2024-03-13 NOTE — PROGRESS NOTES
SWCM contacted pt today to follow up postoperatively. Pt had arthroplasty of right knee completed on 03/06/2024. Pt was DC to home as planned with OP PT. Pts gentleman friend has been providing caregiver support and providing transportation. Pt inquired if there any additional resources for caregiver support and SWCM reminded pt about the self pay [facility] support. Pt also inquired about if her friend/family cannot provide transportation, what she would to do. SWCM reminded pt about the [employer] that she was approved for and that she can use their service also. Pt was appreciative of the call and follow up. SWCM will close referral and remain available for any future needs or concerns.

## 2024-03-22 ENCOUNTER — APPOINTMENT (OUTPATIENT)
Dept: RADIOLOGY | Facility: AMBULARY SURGERY CENTER | Age: 79
End: 2024-03-22
Payer: MEDICARE

## 2024-03-22 ENCOUNTER — OFFICE VISIT (OUTPATIENT)
Dept: OBGYN CLINIC | Facility: CLINIC | Age: 79
End: 2024-03-22

## 2024-03-22 DIAGNOSIS — Z96.651 S/P TOTAL KNEE REPLACEMENT USING CEMENT, RIGHT: Primary | ICD-10-CM

## 2024-03-22 DIAGNOSIS — Z96.651 S/P TOTAL KNEE REPLACEMENT USING CEMENT, RIGHT: ICD-10-CM

## 2024-03-22 PROCEDURE — 99024 POSTOP FOLLOW-UP VISIT: CPT | Performed by: PHYSICIAN ASSISTANT

## 2024-03-22 PROCEDURE — 73562 X-RAY EXAM OF KNEE 3: CPT

## 2024-03-22 NOTE — PROGRESS NOTES
78 y.o.female presents for 2 weeks postoperative visit status post right TKA. Patient denies any chest pain, shortness or breath or calf pain.  Patient is taking aspirin for DVT prophylaxis.  Pain is well controlled with medication. She is doing well physical therapy and ambulate with the assistance of a walker.    Review of Systems  Review of systems negative unless otherwise specified in HPI    Past Medical History  Past Medical History:   Diagnosis Date    Anemia     Anxiety     Colon polyp     Depression     Diabetes mellitus (HCC)     Fibroid     3.2    GERD (gastroesophageal reflux disease)     Hyperlipidemia     Hypertension     Rheumatoid arthritis (HCC)     rt knee, lt ankle       Past Surgical History  Past Surgical History:   Procedure Laterality Date    BUNIONECTOMY Left     CATARACT EXTRACTION Right     COLONOSCOPY  2013, 2017    DILATION AND CURETTAGE OF UTERUS      ORIF ANKLE FRACTURE Left     [place] ARTHRP KNE CONDYLE&PLATU MEDIAL&LAT COMPARTMENTS Right 3/6/2024    Procedure: ARTHROPLASTY KNEE TOTAL and all associated procedures;  Surgeon: Valentina Montes MD;  Location:  MAIN OR;  Service: Orthopedics    STEROID INJECTION KNEE Right     TONSILLECTOMY      TUBAL LIGATION         Current Medications  Current Outpatient Medications on File Prior to Visit   Medication Sig Dispense Refill    acetaminophen (TYLENOL) 650 mg CR tablet Take 1 tablet (650 mg total) by mouth every 8 (eight) hours as needed for mild pain 30 tablet 0    amLODIPine (NORVASC) 2.5 mg tablet TAKE 1 TABLET(2.5 MG) BY MOUTH DAILY (Patient taking differently: Take 2.5 mg by mouth daily at bedtime) 90 tablet 0    aspirin (ECOTRIN LOW STRENGTH) 81 mg EC tablet Take 1 tablet (81 mg total) by mouth 2 (two) times a day Take 1(one) 81 mg tablet twice daily x 35 days after total joint arthroplasty 70 tablet 0    atorvastatin (LIPITOR) 10 mg tablet Take 10 mg by mouth every morning      calcium carbonate (OS-MARTÍN) 600 MG tablet Take 600 mg by  mouth 2 (two) times a day with meals      Diclofenac Sodium (VOLTAREN) 1 % 2 g as needed      losartan-hydrochlorothiazide (HYZAAR) 100-12.5 MG per tablet Take 1 tablet by mouth daily at bedtime      Lumigan 0.01 % ophthalmic drops PLACE 1 DROP IN BOTH EYES DAILY AT BEDTIME      metFORMIN (GLUCOPHAGE-XR) 500 mg 24 hr tablet TAKE 1 TABLET(500 MG) BY MOUTH TWICE DAILY WITH MEALS (Patient taking differently: 500 mg daily with dinner) 180 tablet 2    metoprolol succinate (TOPROL-XL) 25 mg 24 hr tablet TAKE 1 TABLET(25 MG) BY MOUTH DAILY (Patient taking differently: 25 mg daily at bedtime) 90 tablet 1    Multiple Vitamins-Minerals (multivitamin with minerals) tablet Take 1 tablet by mouth daily 30 tablet 0    Omega-3 Fatty Acids (FISH OIL PO) Take by mouth in the morning      OneTouch Ultra test strip TEST ONCE DAILY 100 strip 0    oxyCODONE (ROXICODONE) 5 immediate release tablet Take 1 tablets every 6 hrs as needed for pain control 30 tablet 0    potassium chloride (MICRO-K) 10 MEQ CR capsule Take 10 mEq by mouth daily      Propylene Glycol-Glycerin (Artificial Tears) 1-0.3 % SOLN       timolol (TIMOPTIC) 0.5 % ophthalmic solution Administer 1 drop to both eyes daily      ascorbic acid (VITAMIN C) 500 MG tablet Take 1 tablet (500 mg total) by mouth 2 (two) times a day (Patient not taking: Reported on 3/22/2024) 60 tablet 1    docusate sodium (COLACE) 100 mg capsule Take 1 capsule (100 mg total) by mouth 2 (two) times a day (Patient not taking: Reported on 3/22/2024) 10 capsule 0    ferrous sulfate 324 (65 Fe) mg Take 1 tablet (324 mg total) by mouth 2 (two) times a day before meals (Patient not taking: Reported on 3/22/2024) 60 tablet 1    folic acid (FOLVITE) 1 mg tablet Take 1 tablet (1 mg total) by mouth daily (Patient not taking: Reported on 3/22/2024) 30 tablet 1    ondansetron (ZOFRAN) 4 mg tablet Take 1 tablet (4 mg total) by mouth every 8 (eight) hours as needed for nausea or vomiting (Patient not taking:  Reported on 3/22/2024) 5 tablet 0     No current facility-administered medications on file prior to visit.       Recent Labs (HCT,HGB,PT,INR,ESR,CRP,GLU,HgA1C)  0   Lab Value Date/Time    HCT 34.4 (L) 02/08/2024 1258    HCT 34.5 (L) 02/10/2015 1308    HGB 10.1 (L) 02/08/2024 1258    HGB 10.4 (L) 02/10/2015 1308    WBC 9.42 02/08/2024 1258    WBC 10.25 (H) 02/10/2015 1308    INR 1.00 02/08/2024 1258    GLUCOSE 107 02/10/2015 1308    HGBA1C 7.4 (H) 02/08/2024 1258    HGBA1C 7.2 (H) 06/21/2023 1235           There is no height or weight on file to calculate BMI.  Wt Readings from Last 3 Encounters:   03/06/24 59.1 kg (130 lb 6.4 oz)   02/15/24 61.6 kg (135 lb 12.8 oz)   12/13/23 66.8 kg (147 lb 3.2 oz)       Physical exam   General: Awake, Alert, Oriented   Eyes: Pupils equal, round and reactive to light    Heart: regular rate and rhythm   Lungs: No audible wheezing   Abdomen: soft  right Lower extremity      Incision well approximated without erythema no tenderness to palpation      5 degrees off of full knee extension flexion to 95 degrees   Active ankle dorsal and plantarflexion without pain, calf nontender palpation   Decreased sensation lateral to incision otherwise sensation intact   Distal pulses present      Imaging  X rays of the right knee reviewed.  X rays reveal excellently aligned right total knee arthroplasty without evidence of loosening or osseous abnormality.    Assessment:  Status post 2 weeks right TKA    Plan:  Weight bearing as tolerated right Lower extremity  Physical therapy  Continue aspirin for DVT prophylaxis  Lifetime Dental antibiotic prophylaxis recommended  Pain medication as needed  Follow-up in 2 months and repeat x-rays right knee

## 2024-03-22 NOTE — PATIENT INSTRUCTIONS
[facility]                                                                                               Dr. Valentina Montes                                                                                                            ANTIBIOTIC USE FOR JOINT REPLACEMENT PATIENTS  You have had surgery to replace one of your joints with a metal prosthesis. Going forward, you should take oral antibiotics before any dental work, including routine cleanings. These procedures are a potential source of injection. If you develop an infection, it could spread to your operative joint and cause complications.  Please show the following guidelines to your medical doctor and dentist, so he/she can prescribe the appropriate medications.  The following information is recommended by the American Heart, Dental, and Orthopedic Associations:  STANDARD GENERAL PROPHYLAXIS  antibiotic prophylaxis recommended lifetime  Recommend refraining from dental procedures until 3 months post operatively  Amoxicillin for Adults:    500mg - Take 4 capsules (2 grams) orally one hour before the procedure  If allergic to Penicillin  Clindamycin for Adults:   300mg - Take 2 capsules (600 mg) orally one hour before the procedure  Azithromycin for Adults:  250mg - Take 2 capsules (500 mg) orally one hour before the procedure  Cephalexin for Adults:     500mg - Take 4 capsules (2 grams) orally one hour before the procedure  Note: Cephalosporins should not be used if you have ever developed an immediate hypersensitivity reaction (i.e., hives, swelling, severe itching, difficulty breathing) to Penicillin  Please feel free to contact our office at [phone] with questions or concerns.

## 2024-05-21 ENCOUNTER — HOSPITAL ENCOUNTER (OUTPATIENT)
Dept: RADIOLOGY | Facility: HOSPITAL | Age: 79
Discharge: HOME | End: 2024-05-21
Attending: ORTHOPAEDIC SURGERY
Payer: MEDICARE

## 2024-05-21 ENCOUNTER — OFFICE VISIT (OUTPATIENT)
Dept: OBGYN CLINIC | Facility: CLINIC | Age: 79
End: 2024-05-21

## 2024-05-21 VITALS — HEIGHT: 62 IN | WEIGHT: 130 LBS | BODY MASS INDEX: 23.92 KG/M2

## 2024-05-21 DIAGNOSIS — Z96.651 S/P TOTAL KNEE REPLACEMENT USING CEMENT, RIGHT: Primary | ICD-10-CM

## 2024-05-21 DIAGNOSIS — Z96.651 S/P TOTAL KNEE REPLACEMENT USING CEMENT, RIGHT: ICD-10-CM

## 2024-05-21 PROCEDURE — 99024 POSTOP FOLLOW-UP VISIT: CPT | Performed by: ORTHOPAEDIC SURGERY

## 2024-05-21 PROCEDURE — 73562 X-RAY EXAM OF KNEE 3: CPT

## 2024-05-21 NOTE — PROGRESS NOTES
78 y.o.female presents for 3 months postoperative visit status post right TKA. Patient denies any chest pain, shortness or breath or calf pain.  Pain is well controlled with medication.  She states she is working on extension of her right knee.  She is happy regarding the pain control she perceives her right lower extremity is longer than her left at this time doing well with sickle therapy denies any changes numbness and tingling    Review of Systems  Review of systems negative unless otherwise specified in HPI    Past Medical History  Past Medical History:   Diagnosis Date    Anemia     Anxiety     Colon polyp     Depression     Diabetes mellitus (HCC)     Fibroid     3.2    GERD (gastroesophageal reflux disease)     Hyperlipidemia     Hypertension     Rheumatoid arthritis (HCC)     rt knee, lt ankle       Past Surgical History  Past Surgical History:   Procedure Laterality Date    BUNIONECTOMY Left     CATARACT EXTRACTION Right     COLONOSCOPY  2013, 2017    DILATION AND CURETTAGE OF UTERUS      ORIF ANKLE FRACTURE Left     [place] ARTHRP KNE CONDYLE&PLATU MEDIAL&LAT COMPARTMENTS Right 3/6/2024    Procedure: ARTHROPLASTY KNEE TOTAL and all associated procedures;  Surgeon: Valentina Montes MD;  Location:  MAIN OR;  Service: Orthopedics    STEROID INJECTION KNEE Right     TONSILLECTOMY      TUBAL LIGATION         Current Medications  Current Outpatient Medications on File Prior to Visit   Medication Sig Dispense Refill    amLODIPine (NORVASC) 2.5 mg tablet TAKE 1 TABLET(2.5 MG) BY MOUTH DAILY (Patient taking differently: Take 2.5 mg by mouth daily at bedtime) 90 tablet 0    ascorbic acid (VITAMIN C) 500 MG tablet Take 1 tablet (500 mg total) by mouth 2 (two) times a day (Patient not taking: Reported on 3/22/2024) 60 tablet 1    aspirin (ECOTRIN LOW STRENGTH) 81 mg EC tablet Take 1 tablet (81 mg total) by mouth 2 (two) times a day Take 1(one) 81 mg tablet twice daily x 35 days after total joint arthroplasty 70 tablet  0    atorvastatin (LIPITOR) 10 mg tablet Take 10 mg by mouth every morning      calcium carbonate (OS-MARTÍN) 600 MG tablet Take 600 mg by mouth 2 (two) times a day with meals      Diclofenac Sodium (VOLTAREN) 1 % 2 g as needed      docusate sodium (COLACE) 100 mg capsule Take 1 capsule (100 mg total) by mouth 2 (two) times a day (Patient not taking: Reported on 3/22/2024) 10 capsule 0    ferrous sulfate 324 (65 Fe) mg Take 1 tablet (324 mg total) by mouth 2 (two) times a day before meals (Patient not taking: Reported on 3/22/2024) 60 tablet 1    folic acid (FOLVITE) 1 mg tablet Take 1 tablet (1 mg total) by mouth daily (Patient not taking: Reported on 3/22/2024) 30 tablet 1    losartan-hydrochlorothiazide (HYZAAR) 100-12.5 MG per tablet Take 1 tablet by mouth daily at bedtime      Lumigan 0.01 % ophthalmic drops PLACE 1 DROP IN BOTH EYES DAILY AT BEDTIME      metFORMIN (GLUCOPHAGE-XR) 500 mg 24 hr tablet TAKE 1 TABLET(500 MG) BY MOUTH TWICE DAILY WITH MEALS (Patient taking differently: 500 mg daily with dinner) 180 tablet 2    metoprolol succinate (TOPROL-XL) 25 mg 24 hr tablet TAKE 1 TABLET(25 MG) BY MOUTH DAILY (Patient taking differently: 25 mg daily at bedtime) 90 tablet 1    Multiple Vitamins-Minerals (multivitamin with minerals) tablet Take 1 tablet by mouth daily 30 tablet 0    Omega-3 Fatty Acids (FISH OIL PO) Take by mouth in the morning      ondansetron (ZOFRAN) 4 mg tablet Take 1 tablet (4 mg total) by mouth every 8 (eight) hours as needed for nausea or vomiting (Patient not taking: Reported on 3/22/2024) 5 tablet 0    OneTouch Ultra test strip TEST ONCE DAILY 100 strip 0    oxyCODONE (ROXICODONE) 5 immediate release tablet Take 1 tablets every 6 hrs as needed for pain control 30 tablet 0    potassium chloride (MICRO-K) 10 MEQ CR capsule Take 10 mEq by mouth daily      Propylene Glycol-Glycerin (Artificial Tears) 1-0.3 % SOLN       timolol (TIMOPTIC) 0.5 % ophthalmic solution Administer 1 drop to both eyes  daily       No current facility-administered medications on file prior to visit.       Recent Labs (HCT,HGB,PT,INR,ESR,CRP,GLU,HgA1C)  0   Lab Value Date/Time    HCT 34.4 (L) 02/08/2024 1258    HCT 34.5 (L) 02/10/2015 1308    HGB 10.1 (L) 02/08/2024 1258    HGB 10.4 (L) 02/10/2015 1308    WBC 9.42 02/08/2024 1258    WBC 10.25 (H) 02/10/2015 1308    INR 1.00 02/08/2024 1258    GLUCOSE 107 02/10/2015 1308    HGBA1C 7.4 (H) 02/08/2024 1258    HGBA1C 7.2 (H) 06/21/2023 1235           Body mass index is 23.78 kg/m².  Wt Readings from Last 3 Encounters:   05/21/24 59 kg (130 lb)   03/06/24 59.1 kg (130 lb 6.4 oz)   02/15/24 61.6 kg (135 lb 12.8 oz)       Physical exam   General: Awake, Alert, Oriented   Eyes: Pupils equal, round and reactive to light    Heart: regular rate and rhythm   Lungs: No audible wheezing   Abdomen: soft  right Lower extremity      Incision well approximated without erythema no tenderness to palpation   5 degrees off of Full knee extension 120 degrees of flexion   Active ankle dorsal and plantarflexion without pain, calf nontender palpation   sensation mildly decreased abdirahman-incisionally otherwise intact   Distal pulses present      Imaging  X rays of the right knee reviewed.  X rays reveal excellently aligned right total knee arthroplasty without evidence of loosening or osseous abnormality.    Assessment:  Status post 11 weeks right TKA    Plan:  Weight bearing as tolerated right Lower extremity  Physical therapy  Lifetime Dental antibiotic prophylaxis recommended  Pain medication as needed  Follow-up in 9 months and repeat x-rays right knee

## 2024-05-21 NOTE — PATIENT INSTRUCTIONS
Hamstring stretching:                                     Perform effective stretching exercises:   3 sets of 10 repetitions (rep)  Hold each rep for 20-30 seconds

## 2024-07-16 ENCOUNTER — OFFICE VISIT (OUTPATIENT)
Dept: OBGYN CLINIC | Facility: CLINIC | Age: 79
End: 2024-07-16
Payer: MEDICARE

## 2024-07-16 VITALS — BODY MASS INDEX: 23.55 KG/M2 | HEIGHT: 62 IN | WEIGHT: 128 LBS

## 2024-07-16 DIAGNOSIS — Z96.651 STATUS POST TOTAL KNEE REPLACEMENT USING CEMENT, RIGHT: Primary | ICD-10-CM

## 2024-07-16 PROCEDURE — 99213 OFFICE O/P EST LOW 20 MIN: CPT | Performed by: ORTHOPAEDIC SURGERY

## 2024-07-16 RX ORDER — UREA 10 %
100 LOTION (ML) TOPICAL DAILY
COMMUNITY

## 2024-07-16 RX ORDER — NICOTINE POLACRILEX 2 MG
GUM BUCCAL
COMMUNITY

## 2024-07-16 NOTE — PROGRESS NOTES
Assessment:   Diagnosis ICD-10-CM Associated Orders   1. Status post total knee replacement using cement, right  Z96.651           Plan:  On exam, she continues to have +5-120 ROM, but improving considering pre op she started +15-20 of extension  She will continue her strengthening with PT and her therapist can show her a transition to Home exercises program (HEP).   Reminder to take ABX prior to any dental cleanings or procedures.   We will see her beginning of March for her 1 year with new xrays.     To do next visit:  Return for Follow up beginning of March 2025.    The above stated was discussed in layman's terms and the patient expressed understanding.  All questions were answered to the patient's satisfaction.       Scribe Attestation      I,:  Sonali Rodriguez am acting as a scribe while in the presence of the attending physician.:       I,:  Valentina Montes MD personally performed the services described in this documentation    as scribed in my presence.:               Subjective:   Jada Lanier is a 79 y.o. female who presents today for a follow-up for her right knee.  Patient is 4 and half months status post right total knee arthroplasty performed on 3/6/2024. She continues with her PT working on her motion and strength. She has some keloid scarring over the incision and noting sensitivity over the lateral aspect of the incision.       Review of systems negative unless otherwise specified in HPI  Review of Systems   Constitutional:  Negative for chills, fever and unexpected weight change.   HENT:  Negative for hearing loss, nosebleeds and sore throat.    Eyes:  Negative for pain, redness and visual disturbance.   Respiratory:  Negative for cough, shortness of breath and wheezing.    Cardiovascular:  Negative for chest pain, palpitations and leg swelling.   Gastrointestinal:  Negative for abdominal pain and nausea.   Genitourinary:  Negative for dyspareunia, dysuria and frequency.   Skin:  Negative  for rash and wound.   Neurological:  Negative for dizziness, numbness and headaches.   Psychiatric/Behavioral:  Negative for decreased concentration and suicidal ideas. The patient is not nervous/anxious.        Past Medical History:   Diagnosis Date    Anemia     Anxiety     Colon polyp     Depression     Diabetes mellitus (HCC)     Fibroid     3.2    GERD (gastroesophageal reflux disease)     Hyperlipidemia     Hypertension     Rheumatoid arthritis (HCC)     rt knee, lt ankle       Past Surgical History:   Procedure Laterality Date    BUNIONECTOMY Left     CATARACT EXTRACTION Right     COLONOSCOPY  2013, 2017    DILATION AND CURETTAGE OF UTERUS      ORIF ANKLE FRACTURE Left     [place] ARTHRP KNE CONDYLE&PLATU MEDIAL&LAT COMPARTMENTS Right 3/6/2024    Procedure: ARTHROPLASTY KNEE TOTAL and all associated procedures;  Surgeon: Valentina Montes MD;  Location:  MAIN OR;  Service: Orthopedics    STEROID INJECTION KNEE Right     TONSILLECTOMY      TUBAL LIGATION         Family History   Problem Relation Age of Onset    Hypertension Mother     Diabetes Mother     Glaucoma Mother     No Known Problems Father     Alzheimer's disease Brother 87    Cancer Brother         malignant tumor pharynx    Colon cancer Other        Social History     Occupational History    Occupation: retired   Tobacco Use    Smoking status: Former     Current packs/day: 0.50     Types: Cigarettes    Smokeless tobacco: Never    Tobacco comments:     quit 1981   Vaping Use    Vaping status: Never Used   Substance and Sexual Activity    Alcohol use: Yes     Comment: occasional    Drug use: Never    Sexual activity: Not Currently         Current Outpatient Medications:     acetaminophen (Arthritis Pain Relief) 650 mg CR tablet, Take 1 tablet (650 mg total) by mouth every 8 (eight) hours as needed for mild pain, Disp: 30 tablet, Rfl: 5    amLODIPine (NORVASC) 2.5 mg tablet, TAKE 1 TABLET(2.5 MG) BY MOUTH DAILY (Patient taking differently: Take 2.5 mg by  mouth daily at bedtime), Disp: 90 tablet, Rfl: 0    Aspirin Low Dose 81 MG EC tablet, Take 1 tablet by mouth twice daily for 35 days after total joint arthroplasty (Patient taking differently: daily), Disp: 70 tablet, Rfl: 0    atorvastatin (LIPITOR) 10 mg tablet, Take 10 mg by mouth every morning, Disp: , Rfl:     Biotin 1 MG CAPS, Take by mouth, Disp: , Rfl:     calcium carbonate (OS-MARTÍN) 600 MG tablet, Take 600 mg by mouth 2 (two) times a day with meals, Disp: , Rfl:     Diclofenac Sodium (VOLTAREN) 1 %, 2 g as needed, Disp: , Rfl:     losartan-hydrochlorothiazide (HYZAAR) 100-12.5 MG per tablet, Take 1 tablet by mouth daily at bedtime, Disp: , Rfl:     Lumigan 0.01 % ophthalmic drops, PLACE 1 DROP IN BOTH EYES DAILY AT BEDTIME, Disp: , Rfl:     metFORMIN (GLUCOPHAGE-XR) 500 mg 24 hr tablet, TAKE 1 TABLET(500 MG) BY MOUTH TWICE DAILY WITH MEALS (Patient taking differently: 500 mg daily with dinner), Disp: 180 tablet, Rfl: 2    metoprolol succinate (TOPROL-XL) 25 mg 24 hr tablet, TAKE 1 TABLET(25 MG) BY MOUTH DAILY (Patient taking differently: 25 mg daily at bedtime), Disp: 90 tablet, Rfl: 1    Multiple Vitamins-Minerals (multivitamin with minerals) tablet, Take 1 tablet by mouth daily, Disp: 30 tablet, Rfl: 0    Omega-3 Fatty Acids (FISH OIL PO), Take by mouth in the morning, Disp: , Rfl:     OneTouch Ultra test strip, TEST ONCE DAILY, Disp: 100 strip, Rfl: 0    potassium chloride (MICRO-K) 10 MEQ CR capsule, Take 10 mEq by mouth daily, Disp: , Rfl:     Propylene Glycol-Glycerin (Artificial Tears) 1-0.3 % SOLN, , Disp: , Rfl:     timolol (TIMOPTIC) 0.5 % ophthalmic solution, Administer 1 drop to both eyes daily, Disp: , Rfl:     vitamin B-12 (CYANOCOBALAMIN) 100 MCG tablet, Take 100 mcg by mouth daily, Disp: , Rfl:     ascorbic acid (VITAMIN C) 500 MG tablet, Take 1 tablet (500 mg total) by mouth 2 (two) times a day (Patient not taking: Reported on 3/22/2024), Disp: 60 tablet, Rfl: 1    docusate sodium (COLACE)  100 mg capsule, Take 1 capsule (100 mg total) by mouth 2 (two) times a day (Patient not taking: Reported on 7/16/2024), Disp: 10 capsule, Rfl: 5    ferrous sulfate 324 (65 Fe) mg, Take 1 tablet (324 mg total) by mouth 2 (two) times a day before meals (Patient not taking: Reported on 3/22/2024), Disp: 60 tablet, Rfl: 1    folic acid (FOLVITE) 1 mg tablet, Take 1 tablet (1 mg total) by mouth daily (Patient not taking: Reported on 3/22/2024), Disp: 30 tablet, Rfl: 1    ondansetron (ZOFRAN) 4 mg tablet, Take 1 tablet (4 mg total) by mouth every 8 (eight) hours as needed for nausea or vomiting (Patient not taking: Reported on 7/16/2024), Disp: 5 tablet, Rfl: 1    oxyCODONE (ROXICODONE) 5 immediate release tablet, Take 1 tablets every 6 hrs as needed for pain control (Patient not taking: Reported on 7/16/2024), Disp: 30 tablet, Rfl: 0    Allergies   Allergen Reactions    Shellfish Allergy - Food Allergy Anaphylaxis    Iodine - Food Allergy Rash     Other Reaction(s): Not available, Unknown    Medical Tape Rash    Other Rash     Other reaction(s): bandaids          There were no vitals filed for this visit.    Body mass index is 23.41 kg/m².  Wt Readings from Last 3 Encounters:   07/16/24 58.1 kg (128 lb)   05/21/24 59 kg (130 lb)   03/06/24 59.1 kg (130 lb 6.4 oz)       Objective:                    Right Knee Exam     Tenderness   The patient is experiencing tenderness in the medial joint line.    Range of Motion   Extension:  -5   Flexion:  120     Tests   Varus: negative Valgus: negative    Other   Erythema: absent  Sensation: normal  Pulse: present  Swelling: none  Effusion: no effusion present    Comments:  Calf is soft and non tender            Diagnostics, reviewed and taken today if performed as documented:    None performed      The attending physician has personally reviewed the pertinent films in PACS and interpretation is as follows:      Procedures, if performed today:    Procedures    None performed   "    Portions of the record may have been created with voice recognition software.  Occasional wrong word or \"sound a like\" substitutions may have occurred due to the inherent limitations of voice recognition software.  Read the chart carefully and recognize, using context, where substitutions have occurred.  "

## 2024-07-16 NOTE — PATIENT INSTRUCTIONS
[facility]                                                                                               Dr. Valentina Montes                                                                                                            ANTIBIOTIC USE FOR JOINT REPLACEMENT PATIENTS  You have had surgery to replace one of your joints with a metal prosthesis. Going forward, you should take oral antibiotics before any dental work, including routine cleanings. These procedures are a potential source of injection. If you develop an infection, it could spread to your operative joint and cause complications.  Please show the following guidelines to your medical doctor and dentist, so he/she can prescribe the appropriate medications.  The following information is recommended by the American Heart, Dental, and Orthopedic Associations:  STANDARD GENERAL PROPHYLAXIS  antibiotic prophylaxis recommended lifetime  Recommend refraining from dental procedures until 3 months post operatively  Amoxicillin for Adults:    500mg - Take 4 capsules (2 grams) orally one hour before the procedure  If allergic to Penicillin  Clindamycin for Adults:   300mg - Take 2 capsules (600 mg) orally one hour before the procedure  Azithromycin for Adults:  250mg - Take 2 capsules (500 mg) orally one hour before the procedure  Cephalexin for Adults:     500mg - Take 4 capsules (2 grams) orally one hour before the procedure  Note: Cephalosporins should not be used if you have ever developed an immediate hypersensitivity reaction (i.e., hives, swelling, severe itching, difficulty breathing) to Penicillin  Please feel free to contact our office at [phone] with questions or concerns.     On exam, she continues to have +5-120 ROM, but improving considering pre op she started +15-20 of extension  She will continue her strengthening with PT and her therapist can show her a transition to Home exercises program (HEP).   Reminder to  take ABX prior to any dental cleanings or procedures.

## 2024-08-27 NOTE — PROGRESS NOTES
Assessment/Plan:    DM- last HBA1C from sep 2020 is 6 7   Continue metformin     HTN - on amlodipine and losartan HCTZ half tablet daily because of lightheadedness     HLD- on statin  Continue the same, lipid panel is controlled     Anemia 2/2 to thalassemia minor -- hb 10 7     No problem-specific Assessment & Plan notes found for this encounter  There are no diagnoses linked to this encounter  Subjective:      Patient ID: Hannah Mejia is a 67 y o  female  Patient is here for follow up of DM, HTN and HLD  She is taking all medicines on time  Complaining of lightheadedness for last few weeks  which resolved by itself  Pain in left arm on waking up at night, no chest pain             Review of Systems      Objective:      /60 (BP Location: Right arm, Patient Position: Sitting, Cuff Size: Adult)   Pulse 78   Temp 97 8 °F (36 6 °C) (Tympanic)   Resp 16   Wt 62 kg (136 lb 9 6 oz)   SpO2 98%   BMI 24 98 kg/m²          Physical Exam
decreased balance during turns/decreased weight-shifting ability

## 2025-02-11 ENCOUNTER — HOSPITAL ENCOUNTER (OUTPATIENT)
Dept: RADIOLOGY | Facility: HOSPITAL | Age: 80
Discharge: HOME | End: 2025-02-11
Payer: MEDICARE

## 2025-02-11 DIAGNOSIS — M54.2 CERVICALGIA: ICD-10-CM

## 2025-02-11 PROCEDURE — 72050 X-RAY EXAM NECK SPINE 4/5VWS: CPT

## 2025-03-11 ENCOUNTER — HOSPITAL ENCOUNTER (OUTPATIENT)
Dept: RADIOLOGY | Facility: HOSPITAL | Age: 80
Discharge: HOME | End: 2025-03-11
Attending: ORTHOPAEDIC SURGERY
Payer: MEDICARE

## 2025-03-11 ENCOUNTER — OFFICE VISIT (OUTPATIENT)
Dept: OBGYN CLINIC | Facility: CLINIC | Age: 80
End: 2025-03-11
Payer: MEDICARE

## 2025-03-11 VITALS — HEIGHT: 62 IN | BODY MASS INDEX: 23.41 KG/M2

## 2025-03-11 DIAGNOSIS — M25.561 RIGHT KNEE PAIN, UNSPECIFIED CHRONICITY: ICD-10-CM

## 2025-03-11 DIAGNOSIS — Z96.651 STATUS POST TOTAL KNEE REPLACEMENT USING CEMENT, RIGHT: Primary | ICD-10-CM

## 2025-03-11 DIAGNOSIS — Z96.651 STATUS POST TOTAL KNEE REPLACEMENT USING CEMENT, RIGHT: ICD-10-CM

## 2025-03-11 PROCEDURE — 99213 OFFICE O/P EST LOW 20 MIN: CPT | Performed by: ORTHOPAEDIC SURGERY

## 2025-03-11 PROCEDURE — 73562 X-RAY EXAM OF KNEE 3: CPT

## 2025-03-11 RX ORDER — POTASSIUM CHLORIDE 750 MG/1
TABLET, EXTENDED RELEASE ORAL
COMMUNITY
Start: 2025-02-11

## 2025-03-11 RX ORDER — DOXYCYCLINE 100 MG/1
CAPSULE ORAL
COMMUNITY
Start: 2025-02-27

## 2025-03-11 RX ORDER — TIZANIDINE 2 MG/1
TABLET ORAL
COMMUNITY
Start: 2025-02-11

## 2025-03-11 NOTE — ASSESSMENT & PLAN NOTE
Weightbearing as tolerated lower extremity  Lifetime antibiotic prophylaxis recommended  Follow-up in 1 year repeat x-rays right knee  Orders:    XR knee 3 vw right non injury; Future

## 2025-03-11 NOTE — PROGRESS NOTES
"Name: Jada Lanier      : 1945      MRN: [id number]  Encounter Provider: Valentina Montes MD  Encounter Date: 3/11/2025   Encounter department: [facility]  :  Assessment & Plan  Status post total knee replacement using cement, right  Weightbearing as tolerated lower extremity  Lifetime antibiotic prophylaxis recommended  Follow-up in 1 year repeat x-rays right knee  Orders:    XR knee 3 vw right non injury; Future    Right knee pain, unspecified chronicity  Weightbearing as tolerated lower extremity  Patient provided home range of motion stretching strengthening exercises  Activities as tolerated           History of Present Illness   HPI  Jada Lanier is a 79 y.o. female who presents today status post 1 year right total knee arthroplasty.  She states she is doing very well regards to her right knee she is improving in motion and has minimal pain in her right knee.  She states having issues going up a full flight of stairs.  She states pain is well under control denies any instability painful clicking popping or catching of her right knee  History obtained from: patient         Objective   Ht 5' 2\" (1.575 m)   LMP  (LMP Unknown)   BMI 23.41 kg/m²            Review Of Systems:   Skin: Normal  Neuro: See HPI  Musculoskeletal: See HPI  All other systems reviewed and are negative    Past Medical History:   Past Medical History:   Diagnosis Date    Anemia     Anxiety     Colon polyp     Depression     Diabetes mellitus (HCC)     Fibroid     3.2    GERD (gastroesophageal reflux disease)     Hyperlipidemia     Hypertension     Rheumatoid arthritis (HCC)     rt knee, lt ankle       Past Surgical History:   Past Surgical History:   Procedure Laterality Date    BUNIONECTOMY Left     CATARACT EXTRACTION Right     COLONOSCOPY  ,     DILATION AND CURETTAGE OF UTERUS      ORIF ANKLE FRACTURE Left     CO ARTHRP KNE CONDYLE&PLATU MEDIAL&LAT COMPARTMENTS Right " 3/6/2024    Procedure: ARTHROPLASTY KNEE TOTAL and all associated procedures;  Surgeon: Valentina Montes MD;  Location:  MAIN OR;  Service: Orthopedics    STEROID INJECTION KNEE Right     TONSILLECTOMY      TUBAL LIGATION         Family History:  Family history reviewed and non-contributory  Family History   Problem Relation Age of Onset    Hypertension Mother     Diabetes Mother     Glaucoma Mother     No Known Problems Father     Alzheimer's disease Brother 87    Cancer Brother         malignant tumor pharynx    Colon cancer Other          Social History:  Social History     Socioeconomic History    Marital status:      Spouse name: None    Number of children: None    Years of education: None    Highest education level: None   Occupational History    Occupation: retired   Tobacco Use    Smoking status: Former     Current packs/day: 0.50     Types: Cigarettes    Smokeless tobacco: Never    Tobacco comments:     quit    Vaping Use    Vaping status: Never Used   Substance and Sexual Activity    Alcohol use: Yes     Comment: occasional    Drug use: Never    Sexual activity: Not Currently   Other Topics Concern    None   Social History Narrative    · Most recent tobacco use screenin2019      · Do you currently or have you served in the [employer]:   No      · Occupation:   retired      ·· Caffeine intake:   None      ·· Marital status:         · Advance directive:   No      · Seat belts used routinely:   Yes      · Sexual orientation:   Heterosexual      · General stress level:   Medium      · Guns present in home:   No      Last modified by [id number]   2019, 03:30         · Date of LMP:       · Date of last pap smear:   2016 - neg      · Menses monthly:   No      · Age at first child:   none     · Age at menarche:   9      · Current birth control method:   Menopause      · If post menopausal, age at menopause:   52      · [place]:   16          Social  Drivers of Health     Financial Resource Strain: Not on file   Food Insecurity: Not on file   Transportation Needs: Not on file   Physical Activity: Not on file   Stress: Not on file   Social Connections: Not on file   Intimate Partner Violence: Not on file   Housing Stability: Not on file       Allergies:   Allergies   Allergen Reactions    Shellfish Allergy - Food Allergy Anaphylaxis    Iodine - Food Allergy Rash     Other Reaction(s): Not available, Unknown    Medical Tape Rash    Other Rash     Other reaction(s): bandaids       Labs:  0   Lab Value Date/Time    HCT 34.4 (L) 02/08/2024 1258    HCT 33.8 (L) 08/15/2022 1030    HCT 34.6 (L) 03/12/2021 0953    HCT 34.5 (L) 02/10/2015 1308    HGB 10.1 (L) 02/08/2024 1258    HGB 10.2 (L) 08/15/2022 1030    HGB 10.6 (L) 03/12/2021 0953    HGB 10.4 (L) 02/10/2015 1308    INR 1.00 02/08/2024 1258    WBC 9.42 02/08/2024 1258    WBC 8.23 08/15/2022 1030    WBC 8.54 03/12/2021 0953    WBC 10.25 (H) 02/10/2015 1308       Meds:    Current Outpatient Medications:     acetaminophen (Arthritis Pain Relief) 650 mg CR tablet, Take 1 tablet (650 mg total) by mouth every 8 (eight) hours as needed for mild pain, Disp: 30 tablet, Rfl: 5    Aspirin Low Dose 81 MG EC tablet, Take 1 tablet by mouth twice daily for 35 days after total joint arthroplasty (Patient taking differently: daily), Disp: 70 tablet, Rfl: 0    atorvastatin (LIPITOR) 10 mg tablet, Take 10 mg by mouth every morning, Disp: , Rfl:     Biotin 1 MG CAPS, Take by mouth, Disp: , Rfl:     calcium carbonate (OS-MARTÍN) 600 MG tablet, Take 600 mg by mouth 2 (two) times a day with meals, Disp: , Rfl:     Diclofenac Sodium (VOLTAREN) 1 %, 2 g as needed, Disp: , Rfl:     doxycycline hyclate (VIBRAMYCIN) 100 mg capsule, , Disp: , Rfl:     losartan-hydrochlorothiazide (HYZAAR) 100-12.5 MG per tablet, Take 1 tablet by mouth daily at bedtime, Disp: , Rfl:     Lumigan 0.01 % ophthalmic drops, PLACE 1 DROP IN BOTH EYES DAILY AT BEDTIME,  Disp: , Rfl:     metFORMIN (GLUCOPHAGE-XR) 500 mg 24 hr tablet, TAKE 1 TABLET(500 MG) BY MOUTH TWICE DAILY WITH MEALS (Patient taking differently: 500 mg daily with dinner), Disp: 180 tablet, Rfl: 2    metoprolol succinate (TOPROL-XL) 25 mg 24 hr tablet, TAKE 1 TABLET(25 MG) BY MOUTH DAILY (Patient taking differently: 25 mg daily at bedtime), Disp: 90 tablet, Rfl: 1    Multiple Vitamins-Minerals (multivitamin with minerals) tablet, Take 1 tablet by mouth daily, Disp: 30 tablet, Rfl: 0    Omega-3 Fatty Acids (FISH OIL PO), Take by mouth in the morning, Disp: , Rfl:     OneTouch Ultra test strip, TEST ONCE DAILY, Disp: 100 strip, Rfl: 0    potassium chloride (Klor-Con M10) 10 mEq tablet, , Disp: , Rfl:     potassium chloride (MICRO-K) 10 MEQ CR capsule, Take 10 mEq by mouth daily, Disp: , Rfl:     Propylene Glycol-Glycerin (Artificial Tears) 1-0.3 % SOLN, , Disp: , Rfl:     timolol (TIMOPTIC) 0.5 % ophthalmic solution, Administer 1 drop to both eyes daily, Disp: , Rfl:     tiZANidine (ZANAFLEX) 2 mg tablet, , Disp: , Rfl:     vitamin B-12 (CYANOCOBALAMIN) 100 MCG tablet, Take 100 mcg by mouth daily, Disp: , Rfl:     amLODIPine (NORVASC) 2.5 mg tablet, TAKE 1 TABLET(2.5 MG) BY MOUTH DAILY (Patient taking differently: Take 2.5 mg by mouth daily at bedtime), Disp: 90 tablet, Rfl: 0    ascorbic acid (VITAMIN C) 500 MG tablet, Take 1 tablet (500 mg total) by mouth 2 (two) times a day (Patient not taking: Reported on 3/11/2025), Disp: 60 tablet, Rfl: 1    docusate sodium (COLACE) 100 mg capsule, Take 1 capsule (100 mg total) by mouth 2 (two) times a day (Patient not taking: Reported on 3/11/2025), Disp: 10 capsule, Rfl: 5    ferrous sulfate 324 (65 Fe) mg, Take 1 tablet (324 mg total) by mouth 2 (two) times a day before meals (Patient not taking: Reported on 3/11/2025), Disp: 60 tablet, Rfl: 1    folic acid (FOLVITE) 1 mg tablet, Take 1 tablet (1 mg total) by mouth daily (Patient not taking: Reported on 3/11/2025), Disp: 30  tablet, Rfl: 1    ondansetron (ZOFRAN) 4 mg tablet, Take 1 tablet (4 mg total) by mouth every 8 (eight) hours as needed for nausea or vomiting (Patient not taking: Reported on 3/11/2025), Disp: 5 tablet, Rfl: 1    oxyCODONE (ROXICODONE) 5 immediate release tablet, Take 1 tablets every 6 hrs as needed for pain control (Patient not taking: Reported on 3/11/2025), Disp: 30 tablet, Rfl: 0    Body mass index is 23.41 kg/m².  Wt Readings from Last 3 Encounters:   07/16/24 58.1 kg (128 lb)   05/21/24 59 kg (130 lb)   03/06/24 59.1 kg (130 lb 6.4 oz)     Physical Exam:   There were no vitals filed for this visit.    General Appearance:    Alert, cooperative, no distress, appears stated age   Head:    Normocephalic, without obvious abnormality, atraumatic   Eyes:    conjunctiva/corneas clear, both eyes        Nose:   Nares normal, septum midline, no drainage    Throat:   Lips normal; teeth and gums normal   Neck:    symmetrical, trachea midline, ;     thyroid:  no enlargement/   Back:     Symmetric, no curvature, ROM normal   Lungs:   No audible wheezing or labored breathing   Chest Wall:    No tenderness or deformity    Heart:    Regular rate and rhythm               Pulses:   2+ and symmetric all extremities   Skin:   Skin color, texture, turgor normal, no rashes or lesions   Neurologic:   normal strength, sensation and reflexes     throughout       Musculoskeletal: right lower extremity  On examination patient's right knee well-healed anterior scar noted 5 degrees all full extension flexion to 120 degrees stable to varus valgus rest no pain palpation proximal tibia and distal femur medially and laterally no pain to palpation patellar quadriceps tendon quadricep hamstring strength out of 5 sensation intact distal pulse present    Radiology:   I personally reviewed the films.  X-rays right knee shows excellently impacted right total knee arthroplasty with evidence of loosening or osseous abnormality                      .

## 2025-03-11 NOTE — ADDENDUM NOTE
Addended by: CHLOE VARGHESE on: 3/11/2025 05:03 PM     Modules accepted: Level of Service     Health Maintenance Summary     Topic Due On Due Status Completed On    Osteoporosis Screening Sep 14, 2001 Overdue     Immunization - Pneumococcal Sep 14, 2001 Overdue     Medicare Wellness Visit Sep 14, 2001 Overdue     IMMUNIZATION - DTaP/Tdap/Td Sep 14, 1955 Overdue     Immunization-Influenza Sep 1, 2017 Overdue     Depression Screening Mar 14, 2019 Not Due Mar 14, 2018          Patient is due for topics as listed above, she wishes to proceed with MWV (Medicare Wellness Visit), but declines Immunization(s) at this time .

## 2025-03-11 NOTE — PATIENT INSTRUCTIONS
Jada Lanier  1945    On license of [facility]                                                                                               Dr. Valentina Montes                                                                                                            ANTIBIOTIC USE FOR JOINT REPLACEMENT PATIENTS  You have had surgery to replace one of your joints with a metal prosthesis. Going forward, you should take oral antibiotics before any dental work, including routine cleanings. These procedures are a potential source of injection. If you develop an infection, it could spread to your operative joint and cause complications.  Please show the following guidelines to your medical doctor and dentist, so he/she can prescribe the appropriate medications.  The following information is recommended by the American Heart, Dental, and Orthopedic Associations:  STANDARD GENERAL PROPHYLAXIS  antibiotic prophylaxis recommended lifetime  Recommend refraining from dental procedures until 3 months post operatively  Amoxicillin for Adults:    500mg - Take 4 capsules (2 grams) orally one hour before the procedure  If allergic to Penicillin  Clindamycin for Adults:   300mg - Take 2 capsules (600 mg) orally one hour before the procedure  Azithromycin for Adults:  250mg - Take 2 capsules (500 mg) orally one hour before the procedure  Cephalexin for Adults:     500mg - Take 4 capsules (2 grams) orally one hour before the procedure  Note: Cephalosporins should not be used if you have ever developed an immediate hypersensitivity reaction (i.e., hives, swelling, severe itching, difficulty breathing) to Penicillin  Please feel free to contact our office at [phone] with questions or concerns.       Hamstrings    Key: Slow & Intentional  Two ways to perform:  Start with 10 reps on each side maintaining neutral pelvis. *   Hold position for a 3-5 count  When form and exercise because less challenging; increase  the REPITITIONS or DURATION your holding.                  Progression:   Standing position or adding a Theraband for resistance requires your body to use more stabilizing muscles to maintain good form while performing the exercises below:                      STRENGTHENING:   Quadriceps:   Isometric Quad sets                        Progression for Quadriceps/VMO:  Hold at 45 degree angle (Picture #1)    Key: Slow & Intentional  Two ways to perform:  Start with 10 reps on each side maintaining neutral pelvis. *   Hold position for a 3-5 count  When form and exercise because less challenging; increase the REPITITIONS or DURATION your holding.    Perform Wall Squats below: 3 Sets of 10 Reps         Hold for 3-5 count          Progression for Quadriceps/VMO:  Use a ball hold between knees to activate VMO  Key: Slow & Intentional  Two ways to perform:  Start with 10 reps on each side maintaining neutral pelvis. *   Hold position for a 3-5 count  When form and exercise because less challenging; increase the REPITITIONS or DURATION your holding.  Perform the Wall Squat w/ball: 3 sets 10 reps        Hold for 3-5 count                        Progression:   Adding a Theraband for resistance while maintaining good form during a wall squat is more challenging.   Perform Wall squat w/Theraband:   3 sets 10 reps  Hold for a 3-5 count

## 2025-07-01 ENCOUNTER — TELEPHONE (OUTPATIENT)
Age: 80
End: 2025-07-01

## 2025-07-01 NOTE — TELEPHONE ENCOUNTER
NP r wrist xray on disk no sx Medicare     Declined a few offers in [place], said she will cb.

## 2025-08-20 ENCOUNTER — TELEPHONE (OUTPATIENT)
Age: 80
End: 2025-08-20

## 2025-08-21 ENCOUNTER — HOSPITAL ENCOUNTER (OUTPATIENT)
Dept: RADIOLOGY | Facility: HOSPITAL | Age: 80
Discharge: HOME | End: 2025-08-21
Payer: MEDICARE

## 2025-08-21 ENCOUNTER — CONSULT (OUTPATIENT)
Dept: RHEUMATOLOGY | Facility: CLINIC | Age: 80
End: 2025-08-21
Payer: MEDICARE

## 2025-08-21 ENCOUNTER — TELEPHONE (OUTPATIENT)
Age: 80
End: 2025-08-21

## 2025-08-21 VITALS
HEIGHT: 62 IN | DIASTOLIC BLOOD PRESSURE: 90 MMHG | BODY MASS INDEX: 25.4 KG/M2 | HEART RATE: 64 BPM | OXYGEN SATURATION: 98 % | TEMPERATURE: 98.2 F | SYSTOLIC BLOOD PRESSURE: 138 MMHG | WEIGHT: 138 LBS

## 2025-08-21 DIAGNOSIS — M19.042 ARTHRITIS OF BOTH HANDS: Primary | ICD-10-CM

## 2025-08-21 DIAGNOSIS — M19.041 ARTHRITIS OF BOTH HANDS: Primary | ICD-10-CM

## 2025-08-21 DIAGNOSIS — D56.9 THALASSEMIA, UNSPECIFIED: ICD-10-CM

## 2025-08-21 PROCEDURE — 73130 X-RAY EXAM OF HAND: CPT

## 2025-08-21 PROCEDURE — 73630 X-RAY EXAM OF FOOT: CPT

## 2025-08-21 PROCEDURE — 99204 OFFICE O/P NEW MOD 45 MIN: CPT | Performed by: STUDENT IN AN ORGANIZED HEALTH CARE EDUCATION/TRAINING PROGRAM

## 2025-08-21 PROCEDURE — G2211 COMPLEX E/M VISIT ADD ON: HCPCS | Performed by: STUDENT IN AN ORGANIZED HEALTH CARE EDUCATION/TRAINING PROGRAM

## 2025-08-21 PROCEDURE — 86200 CCP ANTIBODY: CPT | Performed by: STUDENT IN AN ORGANIZED HEALTH CARE EDUCATION/TRAINING PROGRAM

## 2025-08-21 PROCEDURE — 36415 COLL VENOUS BLD VENIPUNCTURE: CPT | Performed by: STUDENT IN AN ORGANIZED HEALTH CARE EDUCATION/TRAINING PROGRAM

## 2025-08-21 RX ORDER — HYDROXYCHLOROQUINE SULFATE 200 MG/1
TABLET, FILM COATED ORAL
Qty: 135 TABLET | Refills: 3 | Status: SHIPPED | OUTPATIENT
Start: 2025-08-21 | End: 2026-08-21

## 2025-08-21 RX ORDER — MELOXICAM 7.5 MG/1
7.5 TABLET ORAL DAILY
Qty: 60 TABLET | Refills: 1 | Status: SHIPPED | OUTPATIENT
Start: 2025-08-21

## 2025-08-26 LAB — CCP AB SER IA-ACNC: >340 (ref ?–10)

## (undated) DEVICE — COBAN 4 IN STERILE

## (undated) DEVICE — NEEDLE HYPO 22G X 1-1/2 IN

## (undated) DEVICE — GLOVE SRG BIOGEL 8

## (undated) DEVICE — GLOVE SRG BIOGEL 8.5

## (undated) DEVICE — IMPERVIOUS STOCKINETTE: Brand: DEROYAL

## (undated) DEVICE — HOOD: Brand: FLYTE, SURGICOOL

## (undated) DEVICE — HANDPIECE SET WITH RETRACTABLE COAXIAL FAN SPRAY TIP AND SUCTION TUBE: Brand: INTERPULSE

## (undated) DEVICE — CAPIT KNEE ATTUNE RP W/DOM

## (undated) DEVICE — SPONGE SCRUB 4 PCT CHLORHEXIDINE

## (undated) DEVICE — U-DRAPE: Brand: CONVERTORS

## (undated) DEVICE — ACE WRAP 6 IN UNSTERILE

## (undated) DEVICE — BASIC DOUBLE BASIN 2-LF: Brand: MEDLINE INDUSTRIES, INC.

## (undated) DEVICE — SUT VICRYL 1 CT-1 27 IN J261H

## (undated) DEVICE — 3M™ STERI-DRAPE™ INCISE DRAPE 1050 (60CM X 45CM): Brand: STERI-DRAPE™

## (undated) DEVICE — SILVER-COATED ANTIMICROBIAL BARRIER DRESSING: Brand: ACTICOAT   4" X 8"

## (undated) DEVICE — NEEDLE 18 G X 1 1/2 SAFETY

## (undated) DEVICE — PADDING CAST 4 IN  COTTON STRL

## (undated) DEVICE — TOWEL SET X-RAY

## (undated) DEVICE — 3 BONE CEMENT MIXER: Brand: MIXEVAC

## (undated) DEVICE — ABDOMINAL PAD: Brand: DERMACEA

## (undated) DEVICE — PENCIL ELECTROSURG E-Z CLEAN -0035H

## (undated) DEVICE — PROXIMATE SKIN STAPLERS (35 WIDE) CONTAINS 35 STAINLESS STEEL STAPLES (FIXED HEAD): Brand: PROXIMATE

## (undated) DEVICE — RECIPROCATING BLADE, DOUBLE SIDED, OFFSET  (70.0 X 0.8 X 12.5MM)

## (undated) DEVICE — DUAL CUT SAGITTAL BLADE

## (undated) DEVICE — LIGHT HANDLE COVER SLEEVE DISP BLUE STELLAR

## (undated) DEVICE — GLOVE INDICATOR PI UNDERGLOVE SZ 8.5 BLUE

## (undated) DEVICE — STIRRUP STRAP ADULT DISP

## (undated) DEVICE — BETHLEHEM UNIV TOTAL KNEE, KIT: Brand: CARDINAL HEALTH

## (undated) DEVICE — SUT VICRYL 2-0 CT-1 27 IN J259H

## (undated) DEVICE — GAUZE SPONGES,16 PLY: Brand: CURITY

## (undated) DEVICE — TRAY FOLEY 16FR URIMETER SURESTEP